# Patient Record
Sex: FEMALE | Race: WHITE | NOT HISPANIC OR LATINO
[De-identification: names, ages, dates, MRNs, and addresses within clinical notes are randomized per-mention and may not be internally consistent; named-entity substitution may affect disease eponyms.]

---

## 2017-10-31 ENCOUNTER — TRANSCRIPTION ENCOUNTER (OUTPATIENT)
Age: 72
End: 2017-10-31

## 2019-09-24 ENCOUNTER — APPOINTMENT (OUTPATIENT)
Dept: CARDIOLOGY | Facility: CLINIC | Age: 74
End: 2019-09-24
Payer: MEDICARE

## 2019-09-24 VITALS
SYSTOLIC BLOOD PRESSURE: 160 MMHG | BODY MASS INDEX: 27.99 KG/M2 | HEART RATE: 91 BPM | HEIGHT: 65 IN | DIASTOLIC BLOOD PRESSURE: 86 MMHG | OXYGEN SATURATION: 97 % | RESPIRATION RATE: 16 BRPM | WEIGHT: 168 LBS

## 2019-09-24 PROBLEM — Z00.00 ENCOUNTER FOR PREVENTIVE HEALTH EXAMINATION: Status: ACTIVE | Noted: 2019-09-24

## 2019-09-24 PROCEDURE — 99204 OFFICE O/P NEW MOD 45 MIN: CPT

## 2019-09-24 RX ORDER — CEPHALEXIN 500 MG/1
500 CAPSULE ORAL 3 TIMES DAILY
Qty: 30 | Refills: 0 | Status: ACTIVE | COMMUNITY
Start: 2019-09-24 | End: 1900-01-01

## 2019-09-24 RX ORDER — RIVAROXABAN 10 MG/1
10 TABLET, FILM COATED ORAL
Qty: 45 | Refills: 0 | Status: ACTIVE | COMMUNITY
Start: 2019-09-24 | End: 1900-01-01

## 2019-09-24 NOTE — PHYSICAL EXAM
[General Appearance - Well Developed] : well developed [Normal Appearance] : normal appearance [Well Groomed] : well groomed [General Appearance - Well Nourished] : well nourished [No Deformities] : no deformities [General Appearance - In No Acute Distress] : no acute distress [Normal Conjunctiva] : the conjunctiva exhibited no abnormalities [Eyelids - No Xanthelasma] : the eyelids demonstrated no xanthelasmas [Normal Oral Mucosa] : normal oral mucosa [No Oral Pallor] : no oral pallor [No Oral Cyanosis] : no oral cyanosis [Normal Jugular Venous A Waves Present] : normal jugular venous A waves present [Normal Jugular Venous V Waves Present] : normal jugular venous V waves present [No Jugular Venous Donahue A Waves] : no jugular venous donahue A waves [Heart Rate And Rhythm] : heart rate and rhythm were normal [Heart Sounds] : normal S1 and S2 [Murmurs] : no murmurs present [Respiration, Rhythm And Depth] : normal respiratory rhythm and effort [Exaggerated Use Of Accessory Muscles For Inspiration] : no accessory muscle use [Auscultation Breath Sounds / Voice Sounds] : lungs were clear to auscultation bilaterally [Oriented To Time, Place, And Person] : oriented to person, place, and time [Affect] : the affect was normal [Mood] : the mood was normal [No Anxiety] : not feeling anxious [Abnormal Walk] : normal gait [Gait - Sufficient For Exercise Testing] : the gait was sufficient for exercise testing [Nail Clubbing] : no clubbing of the fingernails [Petechial Hemorrhages (___cm)] : no petechial hemorrhages [Cyanosis, Localized] : no localized cyanosis [] : no ischemic changes [FreeTextEntry1] : Scattered VV.  The R anterior thigh branch/GSV is firm, tender to touch, non-compressible.  The skin overlying it is hot and erythematous

## 2019-09-24 NOTE — REVIEW OF SYSTEMS
[Joint Pain] : joint pain [Lower Ext Edema] : lower extremity edema [Muscle Cramps] : muscle cramps [Joint Swelling] : joint swelling [Negative] : Endocrine

## 2019-09-24 NOTE — ASSESSMENT
[FreeTextEntry1] : Assessment:\par 1.  Very symptomatic Superficial thrombophlebitis \par - rapidly progressive symptoms\par - surrounding cellulitis\par 2.  Varicose veins\par \par Plan\par 1.  Duplex today with acute, GSV/anterior thigh branch thrombosis.  NO DVT\par 2.  SURPRISE trial : will treat with half dose Xarelto 10mg daily for 45 days\par 3.  1 week of keflex\par 4.  Repeat duplex in 4 weeks to watch for interval improvement\par 5.  Return after duplex in 4 weeks.

## 2019-09-24 NOTE — REASON FOR VISIT
[Initial Evaluation] : an initial evaluation of [FreeTextEntry1] : 74F HLD, hypothyroidism, COPD/emphysema.  Quit smoking 16 years ago.  She had Epidural shots to the back x 2 this past month.  Had leg cramping after the injection.  Had another injection.  Asked to see a vascular doctor for evaluation.  \par \par The leg pains started after the injections.  She complains of bulging veins.  She does not wear compression garments.  She reports a history of superficial thrombophlebitis 45 years ago on the right (provoked by trauma).  \par \par She reports a history of 2 meniscal tears on the right knee.  The left leg feels ok.  \par \par Cardiologist:  Will see Dr. Irwin soon.  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  \par \par Past Medical History \par Disease Name Date Onset Notes \par Chronic obstructive pulmonary disease 01/31/2013 --  \par Hypercholesterolemia 01/31/2013 --  \par Hypothyroidism 01/31/2013 --  \par \par \par \par \par Medication List \par Name Date Started Instructions \par bilateral mammography 07/08/2019 yearly screening \par diclofenac sodium 50 mg oral tablet,delayed release (/EC) 04/24/2017 take 1 tablet by oral route 2 times a day as needed for 90 days \par gabapentin 300 mg oral capsule 02/06/2019 TAKE 1 CAPSULE TWICE A DAY \par ProAir HFA 90 mcg/actuation inhalation HFA aerosol inhaler 01/22/2015 inhale 2 puffs by inhalation route every 4-6 hours as needed \par Serevent Diskus 50 mcg/dose inhalation blister with device 07/19/2019 USE 1 INHALATION TWICE A DAY IN THE MORNING AND EVENING APPROXIMATELY 12 HOURS APART \par simvastatin 40 mg oral tablet 09/09/2019 TAKE 1 TABLET DAILY IN THE EVENING \par Synthroid 112 mcg oral tablet 05/28/2019 TAKE 1 TABLET DAILY \par \par \par \par \par Allergy List \par Allergen Name Date Reaction Notes \par NO KNOWN DRUG ALLERGIES --  --  --  \par \par \par \par \par Social History \par Finding Status Start/Stop Quantity Notes \par Tobacco Former --/-- --  --  \par \par \par Ultrasound Duplex:  Negative for DVT

## 2019-11-12 ENCOUNTER — APPOINTMENT (OUTPATIENT)
Dept: CARDIOLOGY | Facility: CLINIC | Age: 74
End: 2019-11-12
Payer: MEDICARE

## 2019-11-12 PROCEDURE — 99213 OFFICE O/P EST LOW 20 MIN: CPT

## 2019-11-22 VITALS
DIASTOLIC BLOOD PRESSURE: 86 MMHG | SYSTOLIC BLOOD PRESSURE: 130 MMHG | BODY MASS INDEX: 28.82 KG/M2 | WEIGHT: 173 LBS | HEART RATE: 74 BPM | RESPIRATION RATE: 16 BRPM | HEIGHT: 65 IN | OXYGEN SATURATION: 98 %

## 2019-11-22 NOTE — REVIEW OF SYSTEMS
[Lower Ext Edema] : no extremity edema [Joint Pain] : joint pain [Joint Swelling] : joint swelling [Muscle Cramps] : muscle cramps [Negative] : Heme/Lymph

## 2019-11-22 NOTE — REASON FOR VISIT
[Follow-Up - Clinic] : a clinic follow-up of [FreeTextEntry1] : Followup visit 11/22/2019\par \par She stopped Xarelto 4 days ago and transitioned to aspirin 81mg daily \par Her legs feel ok. Her symptoms improved very quickly with keflex and Xarelto.  \par No bleeding issues.  No CP or SOB.  Feels well overall.  \par \par \par Initial Visit:\par \par 74F HLD, hypothyroidism, COPD/emphysema.  Quit smoking 16 years ago.  She had Epidural shots to the back x 2 this past month.  Had leg cramping after the injection.  Had another injection.  Asked to see a vascular doctor for evaluation.  \par \par The leg pains started after the injections.  She complains of bulging veins.  She does not wear compression garments.  She reports a history of superficial thrombophlebitis 45 years ago on the right (provoked by trauma).  \par \par She reports a history of 2 meniscal tears on the right knee.  The left leg feels ok.  \par \par Cardiologist:  Will see Dr. Irwin soon.  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  \par \par Past Medical History \par Disease Name Date Onset Notes \par Chronic obstructive pulmonary disease 01/31/2013 --  \par Hypercholesterolemia 01/31/2013 --  \par Hypothyroidism 01/31/2013 --  \par \par \par \par \par Medication List \par Name Date Started Instructions \par bilateral mammography 07/08/2019 yearly screening \par diclofenac sodium 50 mg oral tablet,delayed release (/EC) 04/24/2017 take 1 tablet by oral route 2 times a day as needed for 90 days \par gabapentin 300 mg oral capsule 02/06/2019 TAKE 1 CAPSULE TWICE A DAY \par ProAir HFA 90 mcg/actuation inhalation HFA aerosol inhaler 01/22/2015 inhale 2 puffs by inhalation route every 4-6 hours as needed \par Serevent Diskus 50 mcg/dose inhalation blister with device 07/19/2019 USE 1 INHALATION TWICE A DAY IN THE MORNING AND EVENING APPROXIMATELY 12 HOURS APART \par simvastatin 40 mg oral tablet 09/09/2019 TAKE 1 TABLET DAILY IN THE EVENING \par Synthroid 112 mcg oral tablet 05/28/2019 TAKE 1 TABLET DAILY \par \par \par \par \par Allergy List \par Allergen Name Date Reaction Notes \par NO KNOWN DRUG ALLERGIES --  --  --  \par \par \par \par \par Social History \par Finding Status Start/Stop Quantity Notes \par Tobacco Former --/-- --  --  \par \par \par Ultrasound Duplex:  Negative for DVT

## 2019-11-22 NOTE — ASSESSMENT
[FreeTextEntry1] : Assessment:\par 1.  Very symptomatic Superficial thrombophlebitis  - Resolved\par  2.  Varicose veins\par \par Plan\par 1. She will remain off Xarelto\par 2.  Continue aspirin 81mg daily \par 3.  Frequent ambulation, compression garments, stay well hydrated\par 4.  Continue compression garments - don in AM and doff in PM\par 5.  We discussed red flags of recurrent thrombophlebitis.  If this occurs she knows to call or to obtain a repeat venous duplex\par 6.  Otherwise if she remains asymptomatic and no recurrence, return PRN

## 2019-11-22 NOTE — PHYSICAL EXAM
[General Appearance - Well Developed] : well developed [Normal Appearance] : normal appearance [Well Groomed] : well groomed [General Appearance - Well Nourished] : well nourished [No Deformities] : no deformities [General Appearance - In No Acute Distress] : no acute distress [Normal Conjunctiva] : the conjunctiva exhibited no abnormalities [Eyelids - No Xanthelasma] : the eyelids demonstrated no xanthelasmas [Normal Oral Mucosa] : normal oral mucosa [No Oral Pallor] : no oral pallor [No Oral Cyanosis] : no oral cyanosis [Normal Jugular Venous A Waves Present] : normal jugular venous A waves present [Normal Jugular Venous V Waves Present] : normal jugular venous V waves present [No Jugular Venous Donahue A Waves] : no jugular venous donahue A waves [Respiration, Rhythm And Depth] : normal respiratory rhythm and effort [Exaggerated Use Of Accessory Muscles For Inspiration] : no accessory muscle use [Auscultation Breath Sounds / Voice Sounds] : lungs were clear to auscultation bilaterally [Heart Rate And Rhythm] : heart rate and rhythm were normal [Heart Sounds] : normal S1 and S2 [Murmurs] : no murmurs present [Abnormal Walk] : normal gait [Gait - Sufficient For Exercise Testing] : the gait was sufficient for exercise testing [Nail Clubbing] : no clubbing of the fingernails [Cyanosis, Localized] : no localized cyanosis [Petechial Hemorrhages (___cm)] : no petechial hemorrhages [] : no ischemic changes [FreeTextEntry1] : Scattered VV. Non-tender VV.  Soft.  no erythema or tenderness along the leg.  Strong pedal pulses [Oriented To Time, Place, And Person] : oriented to person, place, and time [Affect] : the affect was normal [Mood] : the mood was normal [No Anxiety] : not feeling anxious

## 2019-12-29 ENCOUNTER — TRANSCRIPTION ENCOUNTER (OUTPATIENT)
Age: 74
End: 2019-12-29

## 2022-06-14 ENCOUNTER — APPOINTMENT (OUTPATIENT)
Dept: CARDIOLOGY | Facility: CLINIC | Age: 77
End: 2022-06-14
Payer: MEDICARE

## 2022-06-14 DIAGNOSIS — I80.9 PHLEBITIS AND THROMBOPHLEBITIS OF UNSPECIFIED SITE: ICD-10-CM

## 2022-06-14 PROCEDURE — 99214 OFFICE O/P EST MOD 30 MIN: CPT

## 2022-06-23 PROBLEM — I80.9 SUPERFICIAL THROMBOPHLEBITIS: Status: ACTIVE | Noted: 2019-09-24

## 2022-06-23 NOTE — ASSESSMENT
[FreeTextEntry1] : Assessment:\par 1.  Very symptomatic Superficial thrombophlebitis  - Resolved\par  2.  Varicose veins\par \par Plan\par 1. She will remain off Xarelto\par 2.  Continue aspirin 81mg daily \par 3.  Frequent ambulation, compression garments, stay well hydrated\par 4.  Continue compression garments - don in AM and doff in PM\par 5.  We discussed red flags of recurrent thrombophlebitis.  If this occurs she knows to call or to obtain a repeat venous duplex and to call me - if symptomatic, i will send in xarelto 10mg daily in the event that she has thrombophlebitis.  \par 6.  Otherwise if she remains asymptomatic and no recurrence, return PRN

## 2022-06-23 NOTE — PHYSICAL EXAM
[General Appearance - Well Developed] : well developed [Normal Appearance] : normal appearance [Well Groomed] : well groomed [No Deformities] : no deformities [General Appearance - Well Nourished] : well nourished [General Appearance - In No Acute Distress] : no acute distress [Normal Conjunctiva] : the conjunctiva exhibited no abnormalities [Eyelids - No Xanthelasma] : the eyelids demonstrated no xanthelasmas [No Oral Pallor] : no oral pallor [Normal Oral Mucosa] : normal oral mucosa [No Oral Cyanosis] : no oral cyanosis [Normal Jugular Venous A Waves Present] : normal jugular venous A waves present [Normal Jugular Venous V Waves Present] : normal jugular venous V waves present [No Jugular Venous Donahue A Waves] : no jugular venous donahue A waves [Respiration, Rhythm And Depth] : normal respiratory rhythm and effort [Exaggerated Use Of Accessory Muscles For Inspiration] : no accessory muscle use [Auscultation Breath Sounds / Voice Sounds] : lungs were clear to auscultation bilaterally [Heart Rate And Rhythm] : heart rate and rhythm were normal [Heart Sounds] : normal S1 and S2 [Murmurs] : no murmurs present [Abnormal Walk] : normal gait [Gait - Sufficient For Exercise Testing] : the gait was sufficient for exercise testing [Nail Clubbing] : no clubbing of the fingernails [Cyanosis, Localized] : no localized cyanosis [Petechial Hemorrhages (___cm)] : no petechial hemorrhages [] : no ischemic changes [Oriented To Time, Place, And Person] : oriented to person, place, and time [Affect] : the affect was normal [Mood] : the mood was normal [No Anxiety] : not feeling anxious [FreeTextEntry1] : Scattered VV. Non-tender VV.  Soft.  no erythema or tenderness along the leg.  Strong pedal pulses

## 2022-06-23 NOTE — REASON FOR VISIT
[Follow-Up - Clinic] : a clinic follow-up of [FreeTextEntry1] : 6/14/2022\par Doing ok\par remains on aspirin 81\par had booster, felt some phlebitis, took full stregth aspirin, went away.\par Duplex was negative for DVT\par R inner thigh with soft, compressible VV\par some edema\par no cp or sob\par \par \par Followup visit 11/22/2019\par \par She stopped Xarelto 4 days ago and transitioned to aspirin 81mg daily \par Her legs feel ok. Her symptoms improved very quickly with keflex and Xarelto.  \par No bleeding issues.  No CP or SOB.  Feels well overall.  \par \par \par Initial Visit:\par \par 74F HLD, hypothyroidism, COPD/emphysema.  Quit smoking 16 years ago.  She had Epidural shots to the back x 2 this past month.  Had leg cramping after the injection.  Had another injection.  Asked to see a vascular doctor for evaluation.  \par \par The leg pains started after the injections.  She complains of bulging veins.  She does not wear compression garments.  She reports a history of superficial thrombophlebitis 45 years ago on the right (provoked by trauma).  \par \par She reports a history of 2 meniscal tears on the right knee.  The left leg feels ok.  \par \par Cardiologist:  Will see Dr. Irwin soon.  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  \par \par Past Medical History \par Disease Name Date Onset Notes \par Chronic obstructive pulmonary disease 01/31/2013 --  \par Hypercholesterolemia 01/31/2013 --  \par Hypothyroidism 01/31/2013 --  \par \par \par \par \par Medication List \par Name Date Started Instructions \par bilateral mammography 07/08/2019 yearly screening \par diclofenac sodium 50 mg oral tablet,delayed release (/EC) 04/24/2017 take 1 tablet by oral route 2 times a day as needed for 90 days \par gabapentin 300 mg oral capsule 02/06/2019 TAKE 1 CAPSULE TWICE A DAY \par ProAir HFA 90 mcg/actuation inhalation HFA aerosol inhaler 01/22/2015 inhale 2 puffs by inhalation route every 4-6 hours as needed \par Serevent Diskus 50 mcg/dose inhalation blister with device 07/19/2019 USE 1 INHALATION TWICE A DAY IN THE MORNING AND EVENING APPROXIMATELY 12 HOURS APART \par simvastatin 40 mg oral tablet 09/09/2019 TAKE 1 TABLET DAILY IN THE EVENING \par Synthroid 112 mcg oral tablet 05/28/2019 TAKE 1 TABLET DAILY \par \par \par \par \par Allergy List \par Allergen Name Date Reaction Notes \par NO KNOWN DRUG ALLERGIES --  --  --  \par \par \par \par \par Social History \par Finding Status Start/Stop Quantity Notes \par Tobacco Former --/-- --  --  \par \par \par Ultrasound Duplex:  Negative for DVT

## 2022-10-26 ENCOUNTER — NON-APPOINTMENT (OUTPATIENT)
Age: 77
End: 2022-10-26

## 2023-01-16 ENCOUNTER — NON-APPOINTMENT (OUTPATIENT)
Age: 78
End: 2023-01-16

## 2024-03-15 ENCOUNTER — APPOINTMENT (OUTPATIENT)
Dept: ORTHOPEDIC SURGERY | Facility: CLINIC | Age: 79
End: 2024-03-15
Payer: MEDICARE

## 2024-03-15 DIAGNOSIS — Z86.79 PERSONAL HISTORY OF OTHER DISEASES OF THE CIRCULATORY SYSTEM: ICD-10-CM

## 2024-03-15 DIAGNOSIS — J43.9 EMPHYSEMA, UNSPECIFIED: ICD-10-CM

## 2024-03-15 DIAGNOSIS — M17.11 UNILATERAL PRIMARY OSTEOARTHRITIS, RIGHT KNEE: ICD-10-CM

## 2024-03-15 PROCEDURE — 99205 OFFICE O/P NEW HI 60 MIN: CPT

## 2024-03-15 PROCEDURE — 73564 X-RAY EXAM KNEE 4 OR MORE: CPT | Mod: RT

## 2024-03-15 NOTE — HISTORY OF PRESENT ILLNESS
[7] : 7 [8] : 8 [Sharp] : sharp [] : yes [Nothing helps with pain getting better] : Nothing helps with pain getting better [Lying in bed] : lying in bed [de-identified] : Patient presents right knee pain. Pain keeps her up at night and she has to put a cushion underneath her knee. Received gel shots 5-6 years ago, helped very little. Nothing helps the pain improve.   PMHx   [FreeTextEntry7] : into right ankle

## 2024-03-15 NOTE — PHYSICAL EXAM
[Right] : right knee [] : no erythema [TWNoteComboBox7] : flexion 110 degrees [de-identified] : extension 0 degrees

## 2024-03-15 NOTE — ASSESSMENT
[FreeTextEntry1] : 78 year F WITH MODERATE RT KNEE PAIN. HAS TRIED INJECTIONS IN THE PAST WITH MINIMAL RELIEF. PAIN WORSENS WITH STAIRS AND WALKING PROLONGED DISTANCES. PAIN IS AFFECTING ADL AND FUNCTIONAL ACTIVITIES. XRAYS REVIEWED WITH ADVANCED TRICOMPARTMENTAL OA, VALGUS ALIGNMENT. TREATMENT OPTIONS REVIEWED. QUESTIONS ANSWERED. RT TKA DISCUSSED AT LENGTH.   PMHx: HTN, HLD, EMPHYSEMA, H/O RECURRENT BLADDER/URINARY TRACT INFECTIONS, H/O RT SIDED SUPERFICIAL THROMBOPHLEBITIS NO METAL ALLERGIES NO H/O DM NO H/O DVT/PE NO H/O MRSA/VRSA  RT TKA -   DISCUSSED R&B OF TKA. WILL ORDER CT TO EVAL FOR BONE LOSS AND DEFORMITY FOR PRE-OP PLANNING.   The patient was advised of the diagnosis. The natural history of the pathology was explained in full to the patient in layman's terms. All questions were answered. The risks and benefits of surgical and non-surgical treatment alternatives were explained in full to the patient.   We discussed my findings and the natural history of their condition. We talked about the details of the proposed surgery and the recovery. We discussed the material risks, possible benefits and alternatives to surgery. The risks include but are not limited to infection, bleeding and possible need for blood transfusion, fracture, bowel blockage, bladder retention or infection, need for reoperation, stiffness and/or limited range of motion, possible damage to nerves and blood vessels, failure of fixation of components, risk of deep vein thromboses and pulmonary embolism, wound healing problems, dislocation, and possible leg length discrepancy. Although incredibly rare, we also discussed the risks of a cardiac event, stroke and even death during, or following, the surgery. We discussed the type of implants the patient will be receiving and the type of fixation that will be used, as well as whether a robot or computer navigation aide will be used. The patient understands they will need medical clearance and will attend a preoperative joint education class. We also discussed the type of anesthesia they will receive, and the risks associated with hospital or rehab length of stay, obesity, diabetes and smoking.   Patient Complains of pain in the affected joint with a level that often reaches greater than a 8/10. The pain has been progressively worsening throughout his/her treatment course. The pain has interfered with their ADLs and worsens with weight bearing. On exam they often have episodes of swelling/effusion with limited ROM. Pain worsens with ROM passive and active and I can palpate crepitus.   X- rays were reviewed with the patient and they show joint space narrowing, subchondral sclerosis, osteophyte formation, and subchondral cysts. After a period of more than 12 weeks physical therapy or exercise program done with me or another treating physician they have continued pain. The patient has failed a trial of NSAID medication or pain relievers if they were unable to tolerate NSAID medications as well as a series of injections, steroids, or hyaluronic acid. After a long discussion with the patient both the patient and I have decided we have exhausted all forms of less radical treatments and they would like to proceed with Total Joint Replacement.   Patient will plan to schedule surgery. Patient requires rolling walker and 3-in-1 commode S/P surgery. Patient currently as limited mobility that significantly impairs their ability to participate in one or more mobility related activities of daily living in the home. The patient is able to safely use the walker and the functional mobility deficit can be sufficiently resolved with the use of a walker. Patient will also be confined to one level of the home environment and there is no toilet on that level. Requires 3-in-1 commode for bedside use as patient cannot access bathroom safely in their home in timely manner. Will order rolling walker and 3-in-1 commode.

## 2024-04-08 ENCOUNTER — OUTPATIENT (OUTPATIENT)
Dept: OUTPATIENT SERVICES | Facility: HOSPITAL | Age: 79
LOS: 1 days | End: 2024-04-08
Payer: MEDICARE

## 2024-04-08 VITALS
TEMPERATURE: 98 F | HEIGHT: 60.5 IN | DIASTOLIC BLOOD PRESSURE: 80 MMHG | RESPIRATION RATE: 15 BRPM | HEART RATE: 75 BPM | SYSTOLIC BLOOD PRESSURE: 154 MMHG | OXYGEN SATURATION: 93 % | WEIGHT: 173.06 LBS

## 2024-04-08 DIAGNOSIS — M25.561 PAIN IN RIGHT KNEE: ICD-10-CM

## 2024-04-08 DIAGNOSIS — Z98.890 OTHER SPECIFIED POSTPROCEDURAL STATES: Chronic | ICD-10-CM

## 2024-04-08 DIAGNOSIS — Z98.891 HISTORY OF UTERINE SCAR FROM PREVIOUS SURGERY: Chronic | ICD-10-CM

## 2024-04-08 DIAGNOSIS — Z90.49 ACQUIRED ABSENCE OF OTHER SPECIFIED PARTS OF DIGESTIVE TRACT: Chronic | ICD-10-CM

## 2024-04-08 DIAGNOSIS — Z90.89 ACQUIRED ABSENCE OF OTHER ORGANS: Chronic | ICD-10-CM

## 2024-04-08 DIAGNOSIS — Z98.49 CATARACT EXTRACTION STATUS, UNSPECIFIED EYE: Chronic | ICD-10-CM

## 2024-04-08 DIAGNOSIS — Z01.818 ENCOUNTER FOR OTHER PREPROCEDURAL EXAMINATION: ICD-10-CM

## 2024-04-08 DIAGNOSIS — J43.9 EMPHYSEMA, UNSPECIFIED: ICD-10-CM

## 2024-04-08 DIAGNOSIS — M17.11 UNILATERAL PRIMARY OSTEOARTHRITIS, RIGHT KNEE: ICD-10-CM

## 2024-04-08 LAB
A1C WITH ESTIMATED AVERAGE GLUCOSE RESULT: 6.2 % — HIGH (ref 4–5.6)
ALBUMIN SERPL ELPH-MCNC: 3.9 G/DL — SIGNIFICANT CHANGE UP (ref 3.3–5)
ALP SERPL-CCNC: 68 U/L — SIGNIFICANT CHANGE UP (ref 30–120)
ALT FLD-CCNC: 18 U/L — SIGNIFICANT CHANGE UP (ref 10–60)
ANION GAP SERPL CALC-SCNC: 6 MMOL/L — SIGNIFICANT CHANGE UP (ref 5–17)
APTT BLD: 34.4 SEC — SIGNIFICANT CHANGE UP (ref 24.5–35.6)
AST SERPL-CCNC: 22 U/L — SIGNIFICANT CHANGE UP (ref 10–40)
BILIRUB SERPL-MCNC: 0.7 MG/DL — SIGNIFICANT CHANGE UP (ref 0.2–1.2)
BUN SERPL-MCNC: 13 MG/DL — SIGNIFICANT CHANGE UP (ref 7–23)
CALCIUM SERPL-MCNC: 9.2 MG/DL — SIGNIFICANT CHANGE UP (ref 8.4–10.5)
CHLORIDE SERPL-SCNC: 104 MMOL/L — SIGNIFICANT CHANGE UP (ref 96–108)
CO2 SERPL-SCNC: 31 MMOL/L — SIGNIFICANT CHANGE UP (ref 22–31)
CREAT SERPL-MCNC: 1.02 MG/DL — SIGNIFICANT CHANGE UP (ref 0.5–1.3)
EGFR: 56 ML/MIN/1.73M2 — LOW
ESTIMATED AVERAGE GLUCOSE: 131 MG/DL — HIGH (ref 68–114)
GLUCOSE SERPL-MCNC: 127 MG/DL — HIGH (ref 70–99)
HCT VFR BLD CALC: 42.5 % — SIGNIFICANT CHANGE UP (ref 34.5–45)
HGB BLD-MCNC: 13.7 G/DL — SIGNIFICANT CHANGE UP (ref 11.5–15.5)
INR BLD: 1.09 RATIO — SIGNIFICANT CHANGE UP (ref 0.85–1.18)
MCHC RBC-ENTMCNC: 30 PG — SIGNIFICANT CHANGE UP (ref 27–34)
MCHC RBC-ENTMCNC: 32.2 GM/DL — SIGNIFICANT CHANGE UP (ref 32–36)
MCV RBC AUTO: 93 FL — SIGNIFICANT CHANGE UP (ref 80–100)
MRSA PCR RESULT.: DETECTED
NRBC # BLD: 0 /100 WBCS — SIGNIFICANT CHANGE UP (ref 0–0)
PLATELET # BLD AUTO: 164 K/UL — SIGNIFICANT CHANGE UP (ref 150–400)
POTASSIUM SERPL-MCNC: 4.2 MMOL/L — SIGNIFICANT CHANGE UP (ref 3.5–5.3)
POTASSIUM SERPL-SCNC: 4.2 MMOL/L — SIGNIFICANT CHANGE UP (ref 3.5–5.3)
PROT SERPL-MCNC: 7.6 G/DL — SIGNIFICANT CHANGE UP (ref 6–8.3)
PROTHROM AB SERPL-ACNC: 11.8 SEC — SIGNIFICANT CHANGE UP (ref 9.5–13)
RBC # BLD: 4.57 M/UL — SIGNIFICANT CHANGE UP (ref 3.8–5.2)
RBC # FLD: 12.7 % — SIGNIFICANT CHANGE UP (ref 10.3–14.5)
S AUREUS DNA NOSE QL NAA+PROBE: DETECTED
SODIUM SERPL-SCNC: 141 MMOL/L — SIGNIFICANT CHANGE UP (ref 135–145)
WBC # BLD: 4.48 K/UL — SIGNIFICANT CHANGE UP (ref 3.8–10.5)
WBC # FLD AUTO: 4.48 K/UL — SIGNIFICANT CHANGE UP (ref 3.8–10.5)

## 2024-04-08 PROCEDURE — G0463: CPT

## 2024-04-08 PROCEDURE — 93010 ELECTROCARDIOGRAM REPORT: CPT

## 2024-04-08 PROCEDURE — 93005 ELECTROCARDIOGRAM TRACING: CPT

## 2024-04-08 NOTE — H&P PST ADULT - NSICDXFAMILYHX_GEN_ALL_CORE_FT
FAMILY HISTORY:  Father  Still living? No  FH: lymphoma, Age at diagnosis: Age Unknown    Mother  Still living? No  Family history of Alzheimer's disease, Age at diagnosis: Age Unknown    Sibling  Still living? No  FH: pancreatic cancer, Age at diagnosis: Age Unknown

## 2024-04-08 NOTE — H&P PST ADULT - PROBLEM SELECTOR PLAN 1
right total knee arthroplasty, preop instructions given; to go for medical, cardiac and pulm clearance

## 2024-04-08 NOTE — H&P PST ADULT - NSICDXPASTSURGICALHX_GEN_ALL_CORE_FT
PAST SURGICAL HISTORY:  S/P appendectomy     S/P cataract surgery     S/P  section     S/P eye surgery     S/P foot surgery, left     S/P laparoscopic cholecystectomy     S/P ovarian cystectomy     S/P right knee arthroscopy     S/P tonsillectomy

## 2024-04-08 NOTE — H&P PST ADULT - HISTORY OF PRESENT ILLNESS
this is a 77 y/o female who has had right knee pain for 7 yrs which has gotten worse, swelling present also; has had shots in the past and drainage of knee with temporary relief; to have right knee replacement

## 2024-04-08 NOTE — H&P PST ADULT - NSICDXPASTMEDICALHX_GEN_ALL_CORE_FT
PAST MEDICAL HISTORY:  DVT, lower extremity     Emphysema of lung     H/O low back pain     H/O varicose veins     Hyperlipidemia     Hypothyroid     Osteoarthritis     Urinary incontinence

## 2024-04-09 RX ORDER — MUPIROCIN 20 MG/G
1 OINTMENT TOPICAL
Qty: 1 | Refills: 0
Start: 2024-04-09 | End: 2024-04-13

## 2024-04-09 NOTE — PROGRESS NOTE ADULT - SUBJECTIVE AND OBJECTIVE BOX
Nasal PCR culture results: MRSA/MSSA detected  Topical mupirocin escribed   Left message for patient to ball back

## 2024-04-19 RX ORDER — APREPITANT 80 MG/1
40 CAPSULE ORAL ONCE
Refills: 0 | Status: DISCONTINUED | OUTPATIENT
Start: 2024-04-24 | End: 2024-04-25

## 2024-04-23 ENCOUNTER — TRANSCRIPTION ENCOUNTER (OUTPATIENT)
Age: 79
End: 2024-04-23

## 2024-04-24 ENCOUNTER — TRANSCRIPTION ENCOUNTER (OUTPATIENT)
Age: 79
End: 2024-04-24

## 2024-04-24 ENCOUNTER — APPOINTMENT (OUTPATIENT)
Dept: ORTHOPEDIC SURGERY | Facility: HOSPITAL | Age: 79
End: 2024-04-24
Payer: MEDICARE

## 2024-04-24 ENCOUNTER — INPATIENT (INPATIENT)
Facility: HOSPITAL | Age: 79
LOS: 0 days | Discharge: HOME | DRG: 470 | End: 2024-04-25
Attending: ORTHOPAEDIC SURGERY | Admitting: ORTHOPAEDIC SURGERY
Payer: COMMERCIAL

## 2024-04-24 VITALS
WEIGHT: 173.72 LBS | OXYGEN SATURATION: 98 % | HEART RATE: 83 BPM | TEMPERATURE: 98 F | HEIGHT: 61 IN | DIASTOLIC BLOOD PRESSURE: 70 MMHG | SYSTOLIC BLOOD PRESSURE: 154 MMHG | RESPIRATION RATE: 19 BRPM

## 2024-04-24 DIAGNOSIS — Z98.891 HISTORY OF UTERINE SCAR FROM PREVIOUS SURGERY: Chronic | ICD-10-CM

## 2024-04-24 DIAGNOSIS — Z90.49 ACQUIRED ABSENCE OF OTHER SPECIFIED PARTS OF DIGESTIVE TRACT: Chronic | ICD-10-CM

## 2024-04-24 DIAGNOSIS — M17.11 UNILATERAL PRIMARY OSTEOARTHRITIS, RIGHT KNEE: ICD-10-CM

## 2024-04-24 DIAGNOSIS — Z98.890 OTHER SPECIFIED POSTPROCEDURAL STATES: Chronic | ICD-10-CM

## 2024-04-24 DIAGNOSIS — Z98.49 CATARACT EXTRACTION STATUS, UNSPECIFIED EYE: Chronic | ICD-10-CM

## 2024-04-24 DIAGNOSIS — Z90.89 ACQUIRED ABSENCE OF OTHER ORGANS: Chronic | ICD-10-CM

## 2024-04-24 PROCEDURE — 99221 1ST HOSP IP/OBS SF/LOW 40: CPT

## 2024-04-24 PROCEDURE — 20985 CPTR-ASST DIR MS PX: CPT

## 2024-04-24 PROCEDURE — 27447 TOTAL KNEE ARTHROPLASTY: CPT | Mod: RT

## 2024-04-24 PROCEDURE — 73560 X-RAY EXAM OF KNEE 1 OR 2: CPT | Mod: 26,RT

## 2024-04-24 PROCEDURE — 27447 TOTAL KNEE ARTHROPLASTY: CPT | Mod: AS,RT

## 2024-04-24 DEVICE — MAKO BONE PIN 4MM X 140MM: Type: IMPLANTABLE DEVICE | Site: RIGHT | Status: FUNCTIONAL

## 2024-04-24 DEVICE — COMP FEM CRUCIATE RETAINING: Type: IMPLANTABLE DEVICE | Site: RIGHT | Status: FUNCTIONAL

## 2024-04-24 DEVICE — BASEPLATE TIB TRIATHLON TRITAN SZ 4: Type: IMPLANTABLE DEVICE | Site: RIGHT | Status: FUNCTIONAL

## 2024-04-24 DEVICE — INSERT TIB BEARING CS X3 SZ 4 9MM: Type: IMPLANTABLE DEVICE | Site: RIGHT | Status: FUNCTIONAL

## 2024-04-24 DEVICE — MAKO BONE PIN 4MM X 110MM: Type: IMPLANTABLE DEVICE | Site: RIGHT | Status: FUNCTIONAL

## 2024-04-24 DEVICE — PATELLA ASYMM TRIATHLON SZ A 32X10MM: Type: IMPLANTABLE DEVICE | Site: RIGHT | Status: FUNCTIONAL

## 2024-04-24 RX ORDER — CELECOXIB 200 MG/1
200 CAPSULE ORAL EVERY 12 HOURS
Refills: 0 | Status: DISCONTINUED | OUTPATIENT
Start: 2024-04-24 | End: 2024-04-25

## 2024-04-24 RX ORDER — ONDANSETRON 8 MG/1
4 TABLET, FILM COATED ORAL EVERY 6 HOURS
Refills: 0 | Status: DISCONTINUED | OUTPATIENT
Start: 2024-04-24 | End: 2024-04-25

## 2024-04-24 RX ORDER — SIMVASTATIN 20 MG/1
1 TABLET, FILM COATED ORAL
Refills: 0 | DISCHARGE

## 2024-04-24 RX ORDER — SIMVASTATIN 20 MG/1
40 TABLET, FILM COATED ORAL AT BEDTIME
Refills: 0 | Status: DISCONTINUED | OUTPATIENT
Start: 2024-04-24 | End: 2024-04-25

## 2024-04-24 RX ORDER — OXYCODONE HYDROCHLORIDE 5 MG/1
5 TABLET ORAL
Refills: 0 | Status: DISCONTINUED | OUTPATIENT
Start: 2024-04-24 | End: 2024-04-25

## 2024-04-24 RX ORDER — ASPIRIN/CALCIUM CARB/MAGNESIUM 324 MG
1 TABLET ORAL
Qty: 28 | Refills: 0
Start: 2024-04-24 | End: 2024-05-07

## 2024-04-24 RX ORDER — OLODATEROL RESPIMAT INHALATION SPRAY 2.5 UG/1
2 SPRAY, METERED RESPIRATORY (INHALATION)
Refills: 0 | DISCHARGE

## 2024-04-24 RX ORDER — MAGNESIUM HYDROXIDE 400 MG/1
30 TABLET, CHEWABLE ORAL DAILY
Refills: 0 | Status: DISCONTINUED | OUTPATIENT
Start: 2024-04-24 | End: 2024-04-25

## 2024-04-24 RX ORDER — SODIUM CHLORIDE 9 MG/ML
1000 INJECTION, SOLUTION INTRAVENOUS
Refills: 0 | Status: DISCONTINUED | OUTPATIENT
Start: 2024-04-24 | End: 2024-04-24

## 2024-04-24 RX ORDER — DEXAMETHASONE 0.5 MG/5ML
8 ELIXIR ORAL ONCE
Refills: 0 | Status: COMPLETED | OUTPATIENT
Start: 2024-04-25 | End: 2024-04-25

## 2024-04-24 RX ORDER — OMEPRAZOLE 10 MG/1
1 CAPSULE, DELAYED RELEASE ORAL
Qty: 30 | Refills: 0
Start: 2024-04-24

## 2024-04-24 RX ORDER — APIXABAN 2.5 MG/1
2.5 TABLET, FILM COATED ORAL EVERY 12 HOURS
Refills: 0 | Status: DISCONTINUED | OUTPATIENT
Start: 2024-04-25 | End: 2024-04-25

## 2024-04-24 RX ORDER — APIXABAN 2.5 MG/1
1 TABLET, FILM COATED ORAL
Qty: 28 | Refills: 0
Start: 2024-04-24 | End: 2024-05-07

## 2024-04-24 RX ORDER — HYDROMORPHONE HYDROCHLORIDE 2 MG/ML
0.5 INJECTION INTRAMUSCULAR; INTRAVENOUS; SUBCUTANEOUS
Refills: 0 | Status: DISCONTINUED | OUTPATIENT
Start: 2024-04-24 | End: 2024-04-24

## 2024-04-24 RX ORDER — ALBUTEROL 90 UG/1
2 AEROSOL, METERED ORAL EVERY 6 HOURS
Refills: 0 | Status: DISCONTINUED | OUTPATIENT
Start: 2024-04-24 | End: 2024-04-25

## 2024-04-24 RX ORDER — CEFAZOLIN SODIUM 1 G
2000 VIAL (EA) INJECTION ONCE
Refills: 0 | Status: COMPLETED | OUTPATIENT
Start: 2024-04-24 | End: 2024-04-24

## 2024-04-24 RX ORDER — VANCOMYCIN HCL 1 G
1000 VIAL (EA) INTRAVENOUS ONCE
Refills: 0 | Status: COMPLETED | OUTPATIENT
Start: 2024-04-24 | End: 2024-04-24

## 2024-04-24 RX ORDER — LEVOTHYROXINE SODIUM 125 MCG
112 TABLET ORAL DAILY
Refills: 0 | Status: DISCONTINUED | OUTPATIENT
Start: 2024-04-24 | End: 2024-04-25

## 2024-04-24 RX ORDER — SODIUM CHLORIDE 9 MG/ML
1000 INJECTION, SOLUTION INTRAVENOUS
Refills: 0 | Status: DISCONTINUED | OUTPATIENT
Start: 2024-04-24 | End: 2024-04-25

## 2024-04-24 RX ORDER — CEFAZOLIN SODIUM 1 G
2000 VIAL (EA) INJECTION EVERY 8 HOURS
Refills: 0 | Status: COMPLETED | OUTPATIENT
Start: 2024-04-24 | End: 2024-04-25

## 2024-04-24 RX ORDER — HYDROMORPHONE HYDROCHLORIDE 2 MG/ML
0.5 INJECTION INTRAMUSCULAR; INTRAVENOUS; SUBCUTANEOUS
Refills: 0 | Status: DISCONTINUED | OUTPATIENT
Start: 2024-04-24 | End: 2024-04-25

## 2024-04-24 RX ORDER — TRANEXAMIC ACID 100 MG/ML
1000 INJECTION, SOLUTION INTRAVENOUS ONCE
Refills: 0 | Status: COMPLETED | OUTPATIENT
Start: 2024-04-24 | End: 2024-04-24

## 2024-04-24 RX ORDER — LEVOTHYROXINE SODIUM 125 MCG
1 TABLET ORAL
Refills: 0 | DISCHARGE

## 2024-04-24 RX ORDER — ACETAMINOPHEN 500 MG
1000 TABLET ORAL ONCE
Refills: 0 | Status: COMPLETED | OUTPATIENT
Start: 2024-04-24 | End: 2024-04-24

## 2024-04-24 RX ORDER — ACETAMINOPHEN 500 MG
1000 TABLET ORAL EVERY 8 HOURS
Refills: 0 | Status: DISCONTINUED | OUTPATIENT
Start: 2024-04-24 | End: 2024-04-25

## 2024-04-24 RX ORDER — SENNA PLUS 8.6 MG/1
2 TABLET ORAL
Qty: 0 | Refills: 0 | DISCHARGE
Start: 2024-04-24

## 2024-04-24 RX ORDER — OXYCODONE HYDROCHLORIDE 5 MG/1
10 TABLET ORAL
Refills: 0 | Status: DISCONTINUED | OUTPATIENT
Start: 2024-04-24 | End: 2024-04-25

## 2024-04-24 RX ORDER — ACETAMINOPHEN 500 MG
2 TABLET ORAL
Qty: 0 | Refills: 0 | DISCHARGE
Start: 2024-04-24

## 2024-04-24 RX ORDER — MIRABEGRON 50 MG/1
1 TABLET, EXTENDED RELEASE ORAL
Refills: 0 | DISCHARGE

## 2024-04-24 RX ORDER — CHLORHEXIDINE GLUCONATE 213 G/1000ML
1 SOLUTION TOPICAL ONCE
Refills: 0 | Status: COMPLETED | OUTPATIENT
Start: 2024-04-24 | End: 2024-04-24

## 2024-04-24 RX ORDER — PANTOPRAZOLE SODIUM 20 MG/1
40 TABLET, DELAYED RELEASE ORAL
Refills: 0 | Status: DISCONTINUED | OUTPATIENT
Start: 2024-04-24 | End: 2024-04-25

## 2024-04-24 RX ORDER — HYDROMORPHONE HYDROCHLORIDE 2 MG/ML
0.2 INJECTION INTRAMUSCULAR; INTRAVENOUS; SUBCUTANEOUS
Refills: 0 | Status: DISCONTINUED | OUTPATIENT
Start: 2024-04-24 | End: 2024-04-24

## 2024-04-24 RX ORDER — SENNA PLUS 8.6 MG/1
2 TABLET ORAL AT BEDTIME
Refills: 0 | Status: DISCONTINUED | OUTPATIENT
Start: 2024-04-24 | End: 2024-04-25

## 2024-04-24 RX ORDER — POLYETHYLENE GLYCOL 3350 17 G/17G
17 POWDER, FOR SOLUTION ORAL AT BEDTIME
Refills: 0 | Status: DISCONTINUED | OUTPATIENT
Start: 2024-04-24 | End: 2024-04-25

## 2024-04-24 RX ORDER — POLYETHYLENE GLYCOL 3350 17 G/17G
17 POWDER, FOR SOLUTION ORAL
Qty: 0 | Refills: 0 | DISCHARGE
Start: 2024-04-24

## 2024-04-24 RX ORDER — CELECOXIB 200 MG/1
1 CAPSULE ORAL
Qty: 56 | Refills: 0
Start: 2024-04-24 | End: 2024-05-21

## 2024-04-24 RX ADMIN — Medication 1000 MILLIGRAM(S): at 22:17

## 2024-04-24 RX ADMIN — Medication 1000 MILLIGRAM(S): at 21:43

## 2024-04-24 RX ADMIN — Medication 250 MILLIGRAM(S): at 19:13

## 2024-04-24 RX ADMIN — CELECOXIB 200 MILLIGRAM(S): 200 CAPSULE ORAL at 22:17

## 2024-04-24 RX ADMIN — Medication 250 MILLIGRAM(S): at 07:02

## 2024-04-24 RX ADMIN — Medication 100 MILLIGRAM(S): at 15:27

## 2024-04-24 RX ADMIN — Medication 400 MILLIGRAM(S): at 14:35

## 2024-04-24 RX ADMIN — SIMVASTATIN 40 MILLIGRAM(S): 20 TABLET, FILM COATED ORAL at 21:44

## 2024-04-24 RX ADMIN — CHLORHEXIDINE GLUCONATE 1 APPLICATION(S): 213 SOLUTION TOPICAL at 07:30

## 2024-04-24 RX ADMIN — CELECOXIB 200 MILLIGRAM(S): 200 CAPSULE ORAL at 21:44

## 2024-04-24 RX ADMIN — Medication 1000 MILLIGRAM(S): at 16:12

## 2024-04-24 RX ADMIN — SODIUM CHLORIDE 125 MILLILITER(S): 9 INJECTION, SOLUTION INTRAVENOUS at 14:37

## 2024-04-24 NOTE — CARE COORDINATION ASSESSMENT. - NSDCPLANSERVICES_GEN_ALL_CORE
CM met with patient, her  and son  at bedside. Patient agreeable to discuss transition of care with her family present.  Patient. A&O x 4. Pt s/p  PROCEDURES: Total right knee replacement.   Pt  resting comfortably / Pt has no complaints at this time.  CM explained role of CM and availability to assist with discharge planning throughout stay.   Provided CM contact information and pt. verbalized understanding. Patient lives in a private house with her , 3 +3 steps with HR to enter. Prior to surgery patient was ind in adls. Patient identified her  as her caregiver at home who will assist her during her recuperation and will transport her home and to MD appointments.   Pt. is agreeable with PT/OT's recommendations for d/c plan to home with HC/PT.  CM explained home care expectations, process, insurance provisions and home safety with good understanding.   Offered list of [facility] and pt. chose [facility] at home care agency.  Provided discharge resources folder. CM made referral accordingly.  Informed them of Anticipated  DC date for 4/25/2024 and for SOC 4/26/2024.  Patient provided with info on the transitional care management/health solutions team who will be following her upon DC.    Noted pt has a [facility] drsg in place/  Educational flyer provided and reviewed with the patient.  Pt verbalizing understanding and in agreement with d/c plans.  DME: Pt has a RW, commode  and cane at home.  PCP: Dr Donaldson [phone]  Pt stated she will be receiving meds to bed from [facility] pharmacy prior to DC./Home Care

## 2024-04-24 NOTE — CONSULT NOTE ADULT - SUBJECTIVE AND OBJECTIVE BOX
79F with HTN, hypothyroidism, urinary incontinence and R knee OA s/p right TKA w/ jina guidance     The patient was seen postoperatively on the medical covarrubias  Reported minimal pain  Denied nausea, CP, SOB or HA      REVIEW OF SYSTEMS:  CONSTITUTIONAL: No fever, weight loss, or fatigue    EYES: No eye pain, visual disturbances, or discharge    ENMT:  No difficulty hearing, tinnitus, vertigo; No sinus or throat pain    NECK: No pain or stiffness    RESPIRATORY: No cough, wheezing, chills or hemoptysis; No shortness of breath    CARDIOVASCULAR: No chest pain, palpitations, dizziness, or leg swelling    GASTROINTESTINAL: No abdominal or epigastric pain. No nausea, vomiting, or hematemesis; No diarrhea or constipation. No melena or hematochezia.    NEUROLOGICAL: No headaches, memory loss, loss of strength, numbness, or tremors    SKIN: No itching, burning, rashes, or lesions     LYMPH NODES: No enlarged glands    ENDOCRINE: No heat or cold intolerance; No hair loss    PSYCHIATRIC: No depression, anxiety, mood swings, or difficulty sleeping    HEME/LYMPH: No easy bruising, or bleeding gums      ALLERGY AND IMMUNOLOGIC: No hives or eczema      PAST MEDICAL & SURGICAL HISTORY:  Hypothyroid      Hyperlipidemia      Urinary incontinence      Emphysema of lung      Osteoarthritis      H/O low back pain      DVT, lower extremity      H/O varicose veins      S/P tonsillectomy      S/P appendectomy      S/P ovarian cystectomy      S/P  section      S/P laparoscopic cholecystectomy      S/P right knee arthroscopy      S/P foot surgery, left      S/P cataract surgery      S/P eye surgery          SOCIAL HISTORY:  Residence: [ ] [facility]  [ ] [facility]  [ ] Community  [ ] Substance abuse:   [ ] Tobacco:   [ ] Alcohol use:     Allergies    No Known Allergies    Intolerances        MEDICATIONS  (STANDING):  acetaminophen     Tablet .. 1000 milliGRAM(s) Oral every 8 hours  aprepitant 40 milliGRAM(s) Oral once  ceFAZolin   IVPB 2000 milliGRAM(s) IV Intermittent every 8 hours  celecoxib 200 milliGRAM(s) Oral every 12 hours  lactated ringers. 1000 milliLiter(s) (125 mL/Hr) IV Continuous <Continuous>  levothyroxine 112 MICROGram(s) Oral daily  pantoprazole    Tablet 40 milliGRAM(s) Oral before breakfast  polyethylene glycol 3350 17 Gram(s) Oral at bedtime  senna 2 Tablet(s) Oral at bedtime  simvastatin 40 milliGRAM(s) Oral at bedtime  vancomycin  IVPB 1000 milliGRAM(s) IV Intermittent once    MEDICATIONS  (PRN):  albuterol    90 MICROgram(s) HFA Inhaler 2 Puff(s) Inhalation every 6 hours PRN Shortness of Breath and/or Wheezing  aluminum hydroxide/magnesium hydroxide/simethicone Suspension 30 milliLiter(s) Oral four times a day PRN Indigestion  HYDROmorphone  Injectable 0.5 milliGRAM(s) IV Push every 3 hours PRN Breakthrough pain  magnesium hydroxide Suspension 30 milliLiter(s) Oral daily PRN Constipation  ondansetron Injectable 4 milliGRAM(s) IV Push every 6 hours PRN Nausea and/or Vomiting  oxyCODONE    IR 10 milliGRAM(s) Oral every 3 hours PRN Severe Pain (7 - 10)  oxyCODONE    IR 5 milliGRAM(s) Oral every 3 hours PRN Moderate Pain (4 - 6)      FAMILY HISTORY:  FH: lymphoma (Father)    Family history of Alzheimer's disease (Mother)    FH: pancreatic cancer (Sibling)        Vital Signs Last 24 Hrs  T(C): 36.4 (2024 15:32), Max: 36.7 (2024 12:06)  T(F): 97.6 (2024 15:32), Max: 98 (2024 12:06)  HR: 72 (2024 15:32) (62 - 83)  BP: 169/74 (2024 15:32) (95/50 - 169/74)  BP(mean): --  RR: 18 (2024 15:32) (13 - 19)  SpO2: 97% (2024 15:32) (94% - 100%)    Parameters below as of 2024 12:06  Patient On (Oxygen Delivery Method): nasal cannula  O2 Flow (L/min): 2    PHYSICAL EXAM:  GENERAL: NAD   HEAD:  Atraumatic, Normocephalic    EYES: EOMI, PERRLA, conjunctiva and sclera clear    NERVOUS SYSTEM:  Alert & Oriented X3, Good concentration; Moving all 4 extremities; No gross sensory deficits    CHEST/LUNG: Clear to auscultation bilaterally; No rales, rhonchi, wheezing, or rubs    HEART: Regular rate and rhythm; No murmurs, rubs, or gallops    ABDOMEN: Soft, Nontender, Nondistended; Bowel sounds present    EXTREMITIES:  2+ Peripheral Pulses, No clubbing, cyanosis, or edema    INCISION R knee dressing clean and dry, neurovascularly intact       LABS:              CAPILLARY BLOOD GLUCOSE          RADIOLOGY & ADDITIONAL STUDIES:    EKG:   Personally Reviewed:  [ ] YES     Imaging:   Personally Reviewed:  [ ] YES     Consultant(s) Notes Reviewed:      Care Discussed with Consultants/Other Providers:

## 2024-04-24 NOTE — CONSULT NOTE ADULT - ASSESSMENT
79F with HLD, hypothyroidism, urinary incontinence POD#1 right TKA    POD#0 R TKA   Multimodal analgesia   Bowel regimen   PT/OT evaluations   VTE ppx    Incentive spirometry    Obtain basic labs     Hypothyroidism   Continue synthroid 112mcg qAM     HTN   Continue zocor 40mg HS     Urinary incontinence  Continue myrbetriq 50mg qd    79F with HLD, hypothyroidism, urinary incontinence POD#0 right TKA    POD#0 R TKA   Multimodal analgesia   Bowel regimen   PT/OT evaluations   VTE ppx    Incentive spirometry    Obtain basic labs     Hypothyroidism   Continue synthroid 112mcg qAM     HTN   Continue zocor 40mg HS     Urinary incontinence  Continue myrbetriq 50mg qd

## 2024-04-24 NOTE — DISCHARGE NOTE NURSING/CASE MANAGEMENT/SOCIAL WORK - PATIENT PORTAL LINK FT
You can access the FollowMyHealth Patient Portal offered by [facility] by registering at the following website: [email or web address]. By joining [employer]’s FollowMyHealth portal, you will also be able to view your health information using other applications (apps) compatible with our system.

## 2024-04-24 NOTE — DISCHARGE NOTE PROVIDER - CARE PROVIDER_API CALL
Marco Hall Eusebio  Orthopaedic Surgery  [address] [place] [id number]  Phone: [phone]  Fax: [phone]  Established Patient  Scheduled Appointment: 05/07/2024 11:00 AM

## 2024-04-24 NOTE — BRIEF OPERATIVE NOTE - COMMENTS
Implants: triathalon: tibia #4 tritanium w/ 9 mm CS X3 insert, femur #5 right, patella 32 mm tritanium button

## 2024-04-24 NOTE — OCCUPATIONAL THERAPY INITIAL EVALUATION ADULT - LIGHT TOUCH SENSATION, RUE, REHAB EVAL
[address] [place]  Dept: [phone]  Dept Fax: [phone]: [phone]    Visit Date: 1/24/2023    Functionality Assessment/Goals Worksheet     On a scale of 0 (Does not Interfere) to 10 (Completely Interferes)     1. Which number describes how during the past week pain has interfered with       the following:  A. General Activity:  7  B. Mood: 6  C. Walking Ability:  6  D. Normal Work (Includes both work outside the home and housework):  8  E. Relations with Other People:   0  F. Sleep:   7  G. Enjoyment of Life:   5    2. Patient Prefers to Take their Pain Medications:     [x]  On a regular basis   []  Only when necessary    []  Does not take pain medications    3. What are the Patient's Goals/Expectations for Visiting Pain Management? []  Learn about my pain    []  Receive Medication   []  Physical Therapy     []  Treat Depression   []  Receive Injections    []  Treat Sleep   []  Deal with Anxiety and Stress   []  Treat Opoid Dependence/Addiction   []  Other:    HPI:   Korin Urias is a 36 y.o. female is here today for    Chief Complaint: Lumbar back pain and SI pain      F/U Lumbar RFA Bilateral L4-5,5-S1  11/14/2022 states receiving about 80% pain relief or benefit. Her job is what increases her pain. 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 She denies any injury or hitting elbow. Just overuse. She continues to have low back SI pain. She went to PT  4/6/2021- May 20/2021 mild relief lasted 2 weeks. Her main pain coplaint is her low back left SI hip pain RLE radicular s/s stops above the knee, feels burning deep bone pain . She has had to take extra Lyrica. She is unable to walk for exercise more than  5 minutes. She has bilateral foot pain she thinks she has heel spurs. She did see Podiatry for foot pain. He recommended orthotics foot inserts and split. Pain increases with bending, lifting, twisting , reaching, pushing, pulling, walking, standing, stairs and getting up and down. Treatments Tried PT, ice, heat, NSAIDS, narcotics, muscle relaxer, OTC rubs creams patches, steroid burst and injections  Pain Description sharp, shooting, stabbing, burning, aching, numbness, tingling and pins and needles  She complains of any ER visits or new health issues since last visit. COVID  Bronchitis URI  Biceps tendinitis Sinusitis     Pain scale with out pain medications or at its worst is 6/10. Pain scale with pain medications or at its best is 3/10. Prior Injections:    She has had right hip steroid injection 3/68116 with 50% pain relief for 4 months starting to wear off now. Lumbar Facet MBB #2@  L4-5,5-S1 Bilateral  5/31/2022 states she received about 80% pain relief for 2-3 months then slowly wore off     Lumbar Facet MBB #1  L4-5,5-S1 Bilateral  7/22/2021  States she received about 80% pain relief for 3 weeks. Radiology:    Lumbar MRI  1/2022  FINDINGS:   There is anatomic vertebral body height and alignment. There are a few scattered focal areas of hyperintense T1 and T2 signal in the lumbar vertebral column is evidence for hemangiomas. Marrow signal is otherwise within normal limits. The conus    terminates in a normal fashion at the L1 level. The cauda equina is normal in appearance without nerve root thickening or clumping identified. Paraspinal soft tissues are unremarkable. At T12-L1 through L2-3 the intervertebral discs are normal in appearance. There is no significant spinal canal or neuroforaminal stenosis. At L3-4 there is no significant spinal canal stenosis. There is mild bilateral neural foraminal stenosis from mild facet hypertrophy and ligament flavum thickening.    At L4-5 there is a minimal disc bulge without significant spinal canal stenosis and mild bilateral neural foraminal stenosis in association with mild facet hypertrophy and ligament flavum thickening. At L5-S1 there is no significant spinal canal or neuroforaminal stenosis. Impression    Minimal degenerative changes at the lower lumbar spine without significant spinal canal or neuroforaminal stenosis at any lumbar level. The patientis allergic to furosemide. Subjective:      Review of Systems   Constitutional:  Positive for activity change. Negative for appetite change, chills, diaphoresis, fatigue, fever and unexpected weight change. HENT:  Negative for congestion, ear pain, hearing loss, mouth sores, nosebleeds, rhinorrhea, sinus pressure and sore throat. Eyes:  Negative for photophobia, pain and visual disturbance. Respiratory:  Negative for cough, chest tightness, shortness of breath and wheezing. COPD ASTHMA   Cardiovascular:  Negative for chest pain and palpitations. Gastrointestinal:  Negative for abdominal pain, constipation, diarrhea, nausea and vomiting. GERD   Endocrine: Negative for cold intolerance, heat intolerance, polydipsia, polyphagia and polyuria. Genitourinary:  Negative for decreased urine volume, difficulty urinating, frequency and hematuria. Musculoskeletal:  Positive for arthralgias, back pain, gait problem and myalgias. Negative for joint swelling, neck pain and neck stiffness. Skin:  Negative for color change and rash. Allergic/Immunologic: Negative for food allergies and immunocompromised state. Neurological:  Negative for dizziness, tremors, seizures, syncope, facial asymmetry, speech difficulty, weakness, light-headedness, numbness and headaches. Hematological:  Does not bruise/bleed easily. Psychiatric/Behavioral:  Negative for agitation, behavioral problems, confusion, decreased concentration, dysphoric mood, hallucinations, self-injury, sleep disturbance and suicidal ideas. The patient is nervous/anxious. The patient is not hyperactive. Depression anxiety      Objective:     Vitals:    01/24/23 1414   BP: 126/72   Site: Left Upper Arm   Position: Sitting   Cuff Size: Medium Adult   Pulse: 70   Weight: 168 lb (76.2 kg)   Height: 5' 1\" (1.549 m)       Physical Exam  Vitals and nursing note reviewed. Constitutional:       General: She is not in acute distress. Appearance: She is well-developed. She is not diaphoretic. HENT:      Head: Normocephalic and atraumatic. Right Ear: External ear normal.      Left Ear: External ear normal.      Nose: Nose normal.      Mouth/Throat:      Pharynx: No oropharyngeal exudate. Eyes:      General: No scleral icterus. Right eye: No discharge. Left eye: No discharge. Conjunctiva/sclera: Conjunctivae normal.      Pupils: Pupils are equal, round, and reactive to light. Neck:      Thyroid: No thyromegaly. Cardiovascular:      Rate and Rhythm: Normal rate and regular rhythm. Heart sounds: Normal heart sounds. No murmur heard. No friction rub. No gallop. Pulmonary:      Effort: Pulmonary effort is normal. No respiratory distress. Breath sounds: Normal breath sounds. No wheezing or rales. Chest:      Chest wall: No tenderness. Abdominal:      General: Bowel sounds are normal. There is no distension. Palpations: Abdomen is soft. Tenderness: There is no abdominal tenderness. There is no guarding or rebound. Musculoskeletal:         General: Tenderness present. Right shoulder: Normal.      Left shoulder: Normal.      Cervical back: Neck supple. Tenderness and bony tenderness present. No edema, erythema or rigidity. Pain with movement and muscular tenderness present. Decreased range of motion. Thoracic back: Spasms, tenderness and bony tenderness present. Decreased range of motion. Lumbar back: Spasms, tenderness and bony tenderness present. Decreased range of motion. Back:       Right hip: Tenderness and bony tenderness present. Decreased range of motion. Decreased strength. Left hip: Tenderness present. Right knee: Tenderness present. Left knee: Tenderness present. Right ankle: Tenderness present. Left ankle: Tenderness present. Right foot: Tenderness and bony tenderness present. Left foot: Tenderness and bony tenderness present. Skin:     General: Skin is warm. Coloration: Skin is not pale. Findings: No erythema or rash. Neurological:      Mental Status: She is alert and oriented to person, place, and time. She is not disoriented. Cranial Nerves: No cranial nerve deficit. Sensory: Sensation is intact. No sensory deficit. Motor: Motor function is intact. No atrophy or abnormal muscle tone. Coordination: Coordination is intact. Coordination normal.      Gait: Gait abnormal.      Deep Tendon Reflexes: Babinski sign absent on the right side. Reflex Scores:       Tricep reflexes are 2+ on the right side and 2+ on the left side. Bicep reflexes are 2+ on the right side and 2+ on the left side. Brachioradialis reflexes are 2+ on the right side and 2+ on the left side. Patellar reflexes are 1+ on the right side and 2+ on the left side. Achilles reflexes are 1+ on the right side and 2+ on the left side. Psychiatric:         Attention and Perception: Attention and perception normal. She is attentive. Mood and Affect: Mood and affect normal. Mood is not anxious or depressed. Affect is not labile, blunt, angry or inappropriate. Speech: Speech normal. She is communicative. Speech is not rapid and pressured, delayed, slurred or tangential.         Behavior: Behavior normal. Behavior is not agitated, slowed, aggressive, withdrawn, hyperactive or combative. Behavior is cooperative. Thought Content:  Thought content normal. Thought content is not paranoid or delusional. Thought content does not include homicidal or suicidal ideation. Thought content does not include homicidal or suicidal plan. Cognition and Memory: Cognition and memory normal. Memory is not impaired. She does not exhibit impaired recent memory or impaired remote memory. Judgment: Judgment normal. Judgment is not impulsive or inappropriate. Comments: anxiety depression      JOSE  Patricks test  positive  Yeoman's  or Gaenslen's positive  Kemps  positive       Assessment:     1. Lumbar spondylosis    2. Thoracic spondylosis    3. Spinal stenosis of lumbar region, unspecified whether neurogenic claudication present    4. SI (sacroiliac) joint inflammation (HCC)    5. Cervical spondylosis    6. Chronic pain syndrome    7. Lumbosacral radiculitis    8. DDD (degenerative disc disease), lumbar    9. Right lateral epicondylitis            Plan:       Testing Labs or Radiology reviewed: lumbar   Testing Labs or Radiology ordered:  Procedures: discussed right epicondyl injection   Discussed with patient about risks with procedure including infection, reaction to medication, increased pain, or bleeding. Medications:Medrol dose pack Neurontin  STOP Motrin  while taking Steroids then restart   Encouraged ice and  elbow brace        Meds. Prescribed:   Orders Placed This Encounter   Medications    methylPREDNISolone (MEDROL DOSEPACK) 4 MG tablet     Sig: Take by mouth. Dispense:  1 kit     Refill:  0         Return in about 3 months (around 4/24/2023).                Electronically signed by EDNA Dumont CNP on1/24/2023 at 2:35 PM within normal limits

## 2024-04-24 NOTE — DISCHARGE NOTE PROVIDER - NSDCHC_MEDRECSTATUS_GEN_ALL_CORE
Balloon inserted to circumflex and middle circumflex. Admission Reconciliation is Completed  Discharge Reconciliation is Not Complete Admission Reconciliation is Completed  Discharge Reconciliation is Completed

## 2024-04-24 NOTE — DISCHARGE NOTE PROVIDER - NSDCCPCAREPLAN_GEN_ALL_CORE_FT
PRINCIPAL DISCHARGE DIAGNOSIS  Diagnosis: Primary osteoarthritis of right knee  Assessment and Plan of Treatment: right TKA w/ jina guidance  Physical Therapy/Occupational Therapy for: ambulation, transfers, stairs, ADL's (activities of daily living), range of motion exercises, and isometrics  -Activity  • Weight Bearing as tolerated with rolling walker.  • Take short, frequent walks increasing the distance that you walk each day as tolerated.  • Change your position every hour to decrease pain and stiffness.  • Continue the exercises taught to you by your physical therapist.  • No driving until cleared by the doctor.  • No tub baths, hot tubs, or swimming pools until instructed by your doctor.  • Do not squat down on the floor.  • Do not kneel or twist your knee.  • Range of Motion Goals: Flexion= 120 degrees, Extension = 0 degrees  Keep incision clean and dry. You have a QUYNH dressing. You may shower. Disconnect QUYNH battery prior to showering. Reconnect battery after showering and press orange button to resume QUYNH power. Remove QUYNH dressing on post-op day #7.  Keep incision clean. DO NOT APPLY ANYTHING to incision site (salves/ointments/creams). Do not scrub incision site. Pat dry after shower.  Use cryocuff for 20 minute sessions w/ at least 1 hr between sessions.  Use a towel or washcloth under cuff; don't place directly on the skin.  Opioid safety :  Do not keep opioid in the medicine cabinet in bathroom or on kitchen counter where others may have access to it.  Do not drive while taking opioid.  To dispose of it some pharmacies do have drop boxes as do police stations.  Do not flush or put in trash.

## 2024-04-24 NOTE — DISCHARGE NOTE PROVIDER - PROVIDER TOKENS
PROVIDER:[TOKEN:[[id number]],SCHEDULEDAPPT:[05/07/2024],SCHEDULEDAPPTTIME:[11:00 AM],ESTABLISHEDPATIENT:[T]]

## 2024-04-24 NOTE — PHYSICAL THERAPY INITIAL EVALUATION ADULT - PERTINENT HX OF CURRENT PROBLEM, REHAB EVAL
Pt is a 78 y/o female with right knee primary OA. Pt is s/p right total knee replacement on 4/24/24.

## 2024-04-24 NOTE — CARE COORDINATION ASSESSMENT. - NSPASTMEDSURGHISTORY_GEN_ALL_CORE_FT
PAST MEDICAL & SURGICAL HISTORY:  H/O varicose veins      DVT, lower extremity      H/O low back pain      Osteoarthritis      Emphysema of lung      Urinary incontinence      Hyperlipidemia      Hypothyroid      S/P eye surgery      S/P cataract surgery      S/P foot surgery, left      S/P right knee arthroscopy      S/P laparoscopic cholecystectomy      S/P  section      S/P ovarian cystectomy      S/P appendectomy      S/P tonsillectomy

## 2024-04-24 NOTE — PATIENT PROFILE ADULT - FALL HARM RISK - RISK INTERVENTIONS

## 2024-04-24 NOTE — DISCHARGE NOTE NURSING/CASE MANAGEMENT/SOCIAL WORK - NSSCNAMETXT_GEN_ALL_CORE
[facility] care agency [phone] will reach out to you within 24-72 hours of your discharge to schedule home care visit/eval appointment with you. Please call agency for any queries regarding home care services

## 2024-04-24 NOTE — DISCHARGE NOTE PROVIDER - NSDCMRMEDTOKEN_GEN_ALL_CORE_FT
Myrbetriq 50 mg oral tablet, extended release: 1 tab(s) orally once a day  Striverdi Respimat 2.5 mcg/inh inhalation aerosol: 2 inhaled once a day  Synthroid 112 mcg (0.112 mg) oral tablet: 1 tab(s) orally once a day  Zocor 40 mg oral tablet: 1 tab(s) orally once a day (at bedtime)   Aspirin EC 81 mg oral delayed release tablet: 1 tab(s) orally every 12 hours Begin once Eliquis regimen is complete. Take Aspirin 2 hours before celebrex  CeleBREX 200 mg oral capsule: 1 cap(s) orally every 12 hours Take 2 hours after aspirin  Eliquis 2.5 mg oral tablet: 1 tab(s) orally every 12 hours Take for the first 14 days after surgery. When complete, may start aspirin regimen  Myrbetriq 50 mg oral tablet, extended release: 1 tab(s) orally once a day  Striverdi Respimat 2.5 mcg/inh inhalation aerosol: 2 inhaled once a day  Synthroid 112 mcg (0.112 mg) oral tablet: 1 tab(s) orally once a day  Zocor 40 mg oral tablet: 1 tab(s) orally once a day (at bedtime)   Aspirin EC 81 mg oral delayed release tablet: 1 tab(s) orally every 12 hours Begin once Eliquis regimen is complete. Take Aspirin 2 hours before celebrex  CeleBREX 200 mg oral capsule: 1 cap(s) orally every 12 hours Take 2 hours after aspirin  Eliquis 2.5 mg oral tablet: 1 tab(s) orally every 12 hours Take for the first 14 days after surgery. When complete, may start aspirin regimen  Myrbetriq 50 mg oral tablet, extended release: 1 tab(s) orally once a day  omeprazole 20 mg oral delayed release capsule: 1 cap(s) orally once a day  Striverdi Respimat 2.5 mcg/inh inhalation aerosol: 2 inhaled once a day  Synthroid 112 mcg (0.112 mg) oral tablet: 1 tab(s) orally once a day  Zocor 40 mg oral tablet: 1 tab(s) orally once a day (at bedtime)   acetaminophen 500 mg oral tablet: 2 tab(s) orally every 8 hours  Aspirin EC 81 mg oral delayed release tablet: 1 tab(s) orally every 12 hours Begin once Eliquis regimen is complete. Take Aspirin 2 hours before celebrex  CeleBREX 200 mg oral capsule: 1 cap(s) orally every 12 hours Take 2 hours after aspirin  Eliquis 2.5 mg oral tablet: 1 tab(s) orally every 12 hours Take for the first 14 days after surgery. When complete, may start aspirin regimen  Myrbetriq 50 mg oral tablet, extended release: 1 tab(s) orally once a day  omeprazole 20 mg oral delayed release capsule: 1 cap(s) orally once a day  polyethylene glycol 3350 oral powder for reconstitution: 17 gram(s) orally once a day (at bedtime)  senna leaf extract oral tablet: 2 tab(s) orally once a day (at bedtime)  Striverdi Respimat 2.5 mcg/inh inhalation aerosol: 2 inhaled once a day  Synthroid 112 mcg (0.112 mg) oral tablet: 1 tab(s) orally once a day  Zocor 40 mg oral tablet: 1 tab(s) orally once a day (at bedtime)   acetaminophen 500 mg oral tablet: 2 tab(s) orally every 8 hours  Aspirin EC 81 mg oral delayed release tablet: 1 tab(s) orally every 12 hours Begin once Eliquis regimen is complete. Take Aspirin 2 hours before celebrex  CeleBREX 200 mg oral capsule: 1 cap(s) orally every 12 hours Take 2 hours after aspirin  Eliquis 2.5 mg oral tablet: 1 tab(s) orally every 12 hours Take for the first 14 days after surgery. When complete, may start aspirin regimen  Myrbetriq 50 mg oral tablet, extended release: 1 tab(s) orally once a day  omeprazole 20 mg oral delayed release capsule: 1 cap(s) orally once a day  polyethylene glycol 3350 oral powder for reconstitution: 17 gram(s) orally once a day (at bedtime)  senna leaf extract oral tablet: 2 tab(s) orally once a day (at bedtime)  Striverdi Respimat 2.5 mcg/inh inhalation aerosol: 2 inhaled once a day  Synthroid 112 mcg (0.112 mg) oral tablet: 1 tab(s) orally once a day  traMADol 50 mg oral tablet: 1 tab(s) orally every 4 hours as needed for  moderate pain or 2 tabs every 6 hrs as needed for severe pain    [id number] MDD: 6  Zocor 40 mg oral tablet: 1 tab(s) orally once a day (at bedtime)

## 2024-04-24 NOTE — BRIEF OPERATIVE NOTE - NSICDXBRIEFPROCEDURE_GEN_ALL_CORE_FT
PROCEDURES:  Arthroplasty, knee, total, robot-assisted, using Grover system 24-Apr-2024 09:56:22 right knee Leslie Rodriguez

## 2024-04-24 NOTE — DISCHARGE NOTE NURSING/CASE MANAGEMENT/SOCIAL WORK - NSDCPEFALRISK_GEN_ALL_CORE
For information on Fall & Injury Prevention, visit: [email or web address] OR  [email or web address] OR  https://www.cdc.gov/steadi/patient.html

## 2024-04-24 NOTE — CARE COORDINATION ASSESSMENT. - NSCAREPROVIDERS_GEN_ALL_CORE_FT
CARE PROVIDERS:  Administration: Maureen Lan  Admitting: Marco Hall  Attending: Marco Hall  Consultant: Hemanth Kelly  Covering Team: PHILOMENA Lombardi  Nurse: Lotus Barcenas  Nurse: Ivy Schafer  Nurse: Matt Farooq  Nurse: Lotus Levy  Nurse: Estelita Simmons  Nurse: Filomena Bach  Nurse: Ariana Westbrook  Override: Lotus Levy  Physical Therapy: Criselda Russo  Physical Therapy: Khadra Leyva  Physical Therapy: Carol Cisse  Primary Team: Westley French  Primary Team: Leslie Rodriguez  Primary Team: Camryn Marrero  : Naya Barkley  Student: Viki Carrizales  Team: MABEL BURTON Hospitalists, Team  UR// Supp. Assoc.: Eileen Call

## 2024-04-24 NOTE — DISCHARGE NOTE PROVIDER - HOSPITAL COURSE
The patient is a 79  y.o. female with severe osteoarthritis of the right knee.  She was evaluated by the PST dept at [facility] and obtained the appropriate medical clearances.  After admission on 4/24/24 and receiving pre-operative parenteral prophylactic antibiotics (ancef vanco (nasal mrsa+)), the patient  underwent an  uncomplicated right TKA w/ jina guidance  A medical consultation from the Hospitalist service was obtained for post-operative medical co-management. Typical Physical & occupational therapy modalities post TKA were performed including ambulation training, range of motion, ADL's, and transfers. Eliquis 2.5 mg every 12 hrs, along w/ bilat venodynes, was given for DVT prophylaxis (caprini 15).  The patient had a clean appearing surgical incision with no sign of surgical site infections and had a stable neuro / vascular exam of the operated extremity.  After progression of mobility guided by the PT/ OT staff,  the patient was felt to benefit from further rehabilitative care for restoration to level of function. This was felt to best be accomplished at  home w/ home care (PT).  Discharge and Orthopedic Care instructions were delineated in the Discharge Plan and reviewed with the patient. All medications were delineated in the medication reconciliation tool and key points were reviewed with the patient. They were deemed stable from an Orthopedic & medical standpoint for discharge today.  Upon completion of Home care she will be  following up with Dr. Hall for office  follow up Orthopedic care.

## 2024-04-24 NOTE — DISCHARGE NOTE NURSING/CASE MANAGEMENT/SOCIAL WORK - CAREGIVER ADDRESS
ADD (attention deficit disorder)    Osteoma    RSV bronchiolitis  as a child  Unspecified fracture of shaft of left radius, subsequent encounter for closed fracture with routine healing same

## 2024-04-24 NOTE — PROGRESS NOTE ADULT - SUBJECTIVE AND OBJECTIVE BOX
Post Op Note- POD #0    SUBJECTIVE    80yo Female status post right TKR.  Patient is alert and comfortable  Pain is well controlled. Rate pain a 0 on a scale of 0-10.    Denies chest pain/shortness of breath/nausea/vomiting.     OBJECTIVE    T(C): 36.7 (04-24-24 @ 12:06), Max: 36.7 (04-24-24 @ 12:06)  HR: 66 (04-24-24 @ 12:06) (62 - 83)  BP: 115/54 (04-24-24 @ 12:06) (95/50 - 154/70)  RR: 18 (04-24-24 @ 12:06) (13 - 19)  SpO2: 100% (04-24-24 @ 12:06) (94% - 100%)  Wt(kg): --      04-24 @ 07:01  -  04-24 @ 13:55  --------------------------------------------------------  IN: 1220 mL / OUT: 50 mL / NET: 1170 mL        PHYSICAL EXAM    Right knee primary dressing C/D/I  Sensation Intact to Light Touch distally  Motor function DF/PF/EHL grossly intact  Toes warm and pink, capillary refill <2 sec.   Calves soft/nontender bilaterally       ASSESSMENT AND PLAN    - Orthopedically stable  - DVT prophylaxis: PAS + Eliquis 2.5 mg Q12h  - OOB as tolerated with PT/OT  - Pain control as clinically indicated  - Medicine to follow   - Continue antibiotics as per SCIP protocol  - Follow up Labs  - Incentive spirometry Post Op Note- POD #0    SUBJECTIVE    78yo Female status post right TKR.  Patient is alert and comfortable  Pain is well controlled. Rate pain a 0 on a scale of 0-10.    Denies chest pain/shortness of breath/nausea/vomiting.     OBJECTIVE    T(C): 36.7 (04-24-24 @ 12:06), Max: 36.7 (04-24-24 @ 12:06)  HR: 66 (04-24-24 @ 12:06) (62 - 83)  BP: 115/54 (04-24-24 @ 12:06) (95/50 - 154/70)  RR: 18 (04-24-24 @ 12:06) (13 - 19)  SpO2: 100% (04-24-24 @ 12:06) (94% - 100%)  Wt(kg): --      04-24 @ 07:01  -  04-24 @ 13:55  --------------------------------------------------------  IN: 1220 mL / OUT: 50 mL / NET: 1170 mL        PHYSICAL EXAM    Right knee primary dressing C/D/I  Sensation Intact to Light Touch distally  Motor function DF/PF/EHL grossly intact  Toes warm and pink, capillary refill <2 sec.   Calves soft/nontender bilaterally       ASSESSMENT AND PLAN    - Orthopedically stable  - DVT prophylaxis: PAS + Eliquis 2.5 mg Q12h  - OOB as tolerated with PT/OT  - Pain control as clinically indicated  - Medicine to follow   - Continue antibiotics as per SCIP protocol  - Follow up Labs  - Incentive spirometry  - Discharge home likely tomorrow

## 2024-04-24 NOTE — DISCHARGE NOTE PROVIDER - NSDCCPTREATMENT_GEN_ALL_CORE_FT
PRINCIPAL PROCEDURE  Procedure: Arthroplasty, knee, total, robot-assisted, using Grover system  Findings and Treatment: right knee  osteoarthritis right knee

## 2024-04-24 NOTE — PHYSICAL THERAPY INITIAL EVALUATION ADULT - ADDITIONAL COMMENTS
Pt lives with  in a private house, there are 3+3 stairs +HR to enter and no stairs to negotiate within. Pt has a walk in shower. PTA pt was independent with ADLs and ambulation with straight cane. Pt owns a RW, commode and cane.

## 2024-04-24 NOTE — DISCHARGE NOTE PROVIDER - NSDCFUSCHEDAPPT_GEN_ALL_CORE_FT
Marco Hall  [facility]  [address]  Scheduled Appointment: 04/24/2024    Maroc Hall  [facility]  [address]  Scheduled Appointment: 05/07/2024     Marco Hall  [facility]  [address] [facility]  Scheduled Appointment: 05/07/2024

## 2024-04-24 NOTE — OCCUPATIONAL THERAPY INITIAL EVALUATION ADULT - LIVES WITH, PROFILE
Pt lives with her  in a private home, 3+3 steps to enter, 0 steps inside with a walk in shower. Pt was independent with a cane prior to admission. Pt owns a RW and commode./spouse

## 2024-04-24 NOTE — PHYSICAL THERAPY INITIAL EVALUATION ADULT - GAIT DEVIATIONS NOTED, PT EVAL
decreased lydia/increased time in double stance/decreased step length/decreased weight-shifting ability

## 2024-04-25 VITALS
SYSTOLIC BLOOD PRESSURE: 138 MMHG | HEART RATE: 69 BPM | DIASTOLIC BLOOD PRESSURE: 67 MMHG | RESPIRATION RATE: 18 BRPM | TEMPERATURE: 98 F | OXYGEN SATURATION: 97 %

## 2024-04-25 LAB
ANION GAP SERPL CALC-SCNC: 7 MMOL/L — SIGNIFICANT CHANGE UP (ref 5–17)
BUN SERPL-MCNC: 11 MG/DL — SIGNIFICANT CHANGE UP (ref 7–23)
CALCIUM SERPL-MCNC: 8.9 MG/DL — SIGNIFICANT CHANGE UP (ref 8.4–10.5)
CHLORIDE SERPL-SCNC: 106 MMOL/L — SIGNIFICANT CHANGE UP (ref 96–108)
CO2 SERPL-SCNC: 28 MMOL/L — SIGNIFICANT CHANGE UP (ref 22–31)
CREAT SERPL-MCNC: 0.8 MG/DL — SIGNIFICANT CHANGE UP (ref 0.5–1.3)
EGFR: 75 ML/MIN/1.73M2 — SIGNIFICANT CHANGE UP
GLUCOSE SERPL-MCNC: 156 MG/DL — HIGH (ref 70–99)
HCT VFR BLD CALC: 36.9 % — SIGNIFICANT CHANGE UP (ref 34.5–45)
HGB BLD-MCNC: 12.4 G/DL — SIGNIFICANT CHANGE UP (ref 11.5–15.5)
MCHC RBC-ENTMCNC: 30.4 PG — SIGNIFICANT CHANGE UP (ref 27–34)
MCHC RBC-ENTMCNC: 33.6 GM/DL — SIGNIFICANT CHANGE UP (ref 32–36)
MCV RBC AUTO: 90.4 FL — SIGNIFICANT CHANGE UP (ref 80–100)
NRBC # BLD: 0 /100 WBCS — SIGNIFICANT CHANGE UP (ref 0–0)
PLATELET # BLD AUTO: 148 K/UL — LOW (ref 150–400)
POTASSIUM SERPL-MCNC: 4.3 MMOL/L — SIGNIFICANT CHANGE UP (ref 3.5–5.3)
POTASSIUM SERPL-SCNC: 4.3 MMOL/L — SIGNIFICANT CHANGE UP (ref 3.5–5.3)
RBC # BLD: 4.08 M/UL — SIGNIFICANT CHANGE UP (ref 3.8–5.2)
RBC # FLD: 12.7 % — SIGNIFICANT CHANGE UP (ref 10.3–14.5)
SODIUM SERPL-SCNC: 141 MMOL/L — SIGNIFICANT CHANGE UP (ref 135–145)
WBC # BLD: 11.92 K/UL — HIGH (ref 3.8–10.5)
WBC # FLD AUTO: 11.92 K/UL — HIGH (ref 3.8–10.5)

## 2024-04-25 PROCEDURE — C1713: CPT

## 2024-04-25 PROCEDURE — S2900: CPT

## 2024-04-25 PROCEDURE — 99232 SBSQ HOSP IP/OBS MODERATE 35: CPT

## 2024-04-25 PROCEDURE — 85027 COMPLETE CBC AUTOMATED: CPT

## 2024-04-25 PROCEDURE — C1776: CPT

## 2024-04-25 PROCEDURE — 97110 THERAPEUTIC EXERCISES: CPT

## 2024-04-25 PROCEDURE — 36415 COLL VENOUS BLD VENIPUNCTURE: CPT

## 2024-04-25 PROCEDURE — 94664 DEMO&/EVAL PT USE INHALER: CPT

## 2024-04-25 PROCEDURE — 80048 BASIC METABOLIC PNL TOTAL CA: CPT

## 2024-04-25 PROCEDURE — 97116 GAIT TRAINING THERAPY: CPT

## 2024-04-25 PROCEDURE — 97535 SELF CARE MNGMENT TRAINING: CPT

## 2024-04-25 PROCEDURE — 97165 OT EVAL LOW COMPLEX 30 MIN: CPT

## 2024-04-25 PROCEDURE — 27447 TOTAL KNEE ARTHROPLASTY: CPT

## 2024-04-25 PROCEDURE — 97530 THERAPEUTIC ACTIVITIES: CPT

## 2024-04-25 PROCEDURE — 97161 PT EVAL LOW COMPLEX 20 MIN: CPT

## 2024-04-25 PROCEDURE — 73560 X-RAY EXAM OF KNEE 1 OR 2: CPT

## 2024-04-25 RX ORDER — TRAMADOL HYDROCHLORIDE 50 MG/1
50 TABLET ORAL EVERY 4 HOURS
Refills: 0 | Status: DISCONTINUED | OUTPATIENT
Start: 2024-04-25 | End: 2024-04-25

## 2024-04-25 RX ORDER — TRAMADOL HYDROCHLORIDE 50 MG/1
1 TABLET ORAL
Qty: 42 | Refills: 0
Start: 2024-04-25

## 2024-04-25 RX ORDER — TRAMADOL HYDROCHLORIDE 50 MG/1
50 TABLET ORAL EVERY 6 HOURS
Refills: 0 | Status: DISCONTINUED | OUTPATIENT
Start: 2024-04-25 | End: 2024-04-25

## 2024-04-25 RX ORDER — TRAMADOL HYDROCHLORIDE 50 MG/1
100 TABLET ORAL EVERY 6 HOURS
Refills: 0 | Status: DISCONTINUED | OUTPATIENT
Start: 2024-04-25 | End: 2024-04-25

## 2024-04-25 RX ADMIN — PANTOPRAZOLE SODIUM 40 MILLIGRAM(S): 20 TABLET, DELAYED RELEASE ORAL at 05:26

## 2024-04-25 RX ADMIN — APIXABAN 2.5 MILLIGRAM(S): 2.5 TABLET, FILM COATED ORAL at 08:58

## 2024-04-25 RX ADMIN — Medication 112 MICROGRAM(S): at 05:26

## 2024-04-25 RX ADMIN — Medication 1000 MILLIGRAM(S): at 05:29

## 2024-04-25 RX ADMIN — Medication 101.6 MILLIGRAM(S): at 05:25

## 2024-04-25 RX ADMIN — CELECOXIB 200 MILLIGRAM(S): 200 CAPSULE ORAL at 09:12

## 2024-04-25 RX ADMIN — Medication 100 MILLIGRAM(S): at 00:51

## 2024-04-25 RX ADMIN — Medication 1000 MILLIGRAM(S): at 14:46

## 2024-04-25 RX ADMIN — SODIUM CHLORIDE 125 MILLILITER(S): 9 INJECTION, SOLUTION INTRAVENOUS at 02:34

## 2024-04-25 RX ADMIN — Medication 1000 MILLIGRAM(S): at 05:25

## 2024-04-25 RX ADMIN — CELECOXIB 200 MILLIGRAM(S): 200 CAPSULE ORAL at 08:58

## 2024-04-25 NOTE — PROGRESS NOTE ADULT - ASSESSMENT
79F with HLD, hypothyroidism, urinary incontinence POD#0 right TKA    POD#1 R TKA   Multimodal analgesia   Bowel regimen   PT/OT evaluations   VTE ppx    Incentive spirometry    Leukocytosis likely reactive, asymptomatic  Medically optimized for DC    Hypothyroidism   Continue synthroid 112mcg qAM     HTN   Continue zocor 40mg HS     Urinary incontinence  Continue myrbetriq 50mg qd

## 2024-04-25 NOTE — PROGRESS NOTE ADULT - SUBJECTIVE AND OBJECTIVE BOX
Eliquis 2.5mg q12h x 2 weeks  ASA EC 81mg q12h x 2 weeks  Celecoxib 200mg q12h x 2-3 weeks  Omeprazole 20mg QAM x 4 weeks  APAP 1000mg q8h x 2-3 weeks  Narcotic PRN  Docusate 100mg TID while taking narcotic  Miralax, Senna, or Bisacodyl PRN for treatment of constipation

## 2024-04-25 NOTE — PROGRESS NOTE ADULT - SUBJECTIVE AND OBJECTIVE BOX
Procedure: Right  TKR  POD #: 1  S: Pt without complaints. No SOB,CP, N/V. Tolerated Diet well.   Pain comfortable on tylenol.  didn't use oxy, prefers not to take it, so switched to tramadol   No BM yet, + flatus, No abdominal pain.  Pain Rx:  acetaminophen     Tablet .. 1000 milliGRAM(s) Oral every 8 hours  aprepitant 40 milliGRAM(s) Oral once  celecoxib 200 milliGRAM(s) Oral every 12 hours  HYDROmorphone  Injectable 0.5 milliGRAM(s) IV Push every 3 hours PRN  ondansetron Injectable 4 milliGRAM(s) IV Push every 6 hours PRN  traMADol 50 milliGRAM(s) Oral every 6 hours PRN  traMADol 50 milliGRAM(s) Oral every 4 hours PRN    O: General: On exam, No Apparent Distress  Vital Signs Last 24 Hrs  T(C): 36.6 (25 Apr 2024 07:51), Max: 36.8 (25 Apr 2024 03:12)  T(F): 97.8 (25 Apr 2024 07:51), Max: 98.3 (25 Apr 2024 03:12)  HR: 69 (25 Apr 2024 07:51) (62 - 78)  BP: 138/67 (25 Apr 2024 07:51) (95/50 - 169/74)  BP(mean): --  RR: 18 (25 Apr 2024 07:51) (13 - 18)  SpO2: 97% (25 Apr 2024 07:51) (94% - 100%)    Parameters below as of 25 Apr 2024 07:51  Patient On (Oxygen Delivery Method): room air        Lungs: BS clear bilat.  Heart: TRRR no murmur  Abdomen: + BS, soft , benign exam     Ext -- right knee ace removed.  viet dry and functioing  ROM: 0-50  Neurologic:  Has sensation over feet & toes bilat. Full AROM bilat feet & toes. EHL/AT = 5/5  Vascular: Feet toes warm, pink. DP = 2+ No calf tenderness bilat..  1+ pitting edema bilat  VTEP: On Bilat. Venodynes + apixaban 2.5 milliGRAM(s) Oral every 12 hours    I&O's Detail    24 Apr 2024 07:01 - 25 Apr 2024 07:00  --------------------------------------------------------  IN:    Lactated Ringers: 1100 mL    Oral Fluid: 120 mL  Total IN: 1220 mL    OUT:    Blood Loss (mL): 50 mL    Voided (mL): 2900 mL  Total OUT: 2950 mL    Total NET: -1730 mL          Activity in PT yesterday Noted. Walked 50'  Hospitalist input noted.  Labs Today:                         12.4   11.92 )-----------( 148      ( 25 Apr 2024 06:00 )             36.9       04-25    141  |  106  |  11  ----------------------------<  156<H>  4.3   |  28  |  0.80    Ca    8.9      25 Apr 2024 03:00        Primary Orthopedic Assessment:  • Stable from Orthopedic perspective  • Neuro motor exam stable  • Labs: CBC / Chem stable      Plan:   • Continue:  PT/OT/WBAT with assistance of a walker/Ice to knee/ Knee ROM         Incentive spirometry encouraged   • Continue DVT prophylaxis as prescribed, including use of compression devices and ankle pumps  • Continue Pain Rx  • Plans per Medicine   • Discharge planning – anticipated discharge is Home D/C with home care & home PT when medically stable & cleared by PT/OT  Opioid safety discussed w/ pt.  Do not keep opioid in the medicine cabinet in bathroom or on kitchen counter where others may have access to it.  Do not drive while taking opioid.  To dispose of it some pharmacies do have drop boxes as do police stations.  Do not flush or put in trash.

## 2024-04-25 NOTE — PHARMACOTHERAPY INTERVENTION NOTE - COMMENTS
Transition of Care video discharge education - medication calendar given to patient   reynaldo ghosh PharmD Candidate for Amber Vega, PharmD 
Admission medication reconciliation POD1

## 2024-04-25 NOTE — PROGRESS NOTE ADULT - SUBJECTIVE AND OBJECTIVE BOX
Patient is a 79y old  Female who presents with a chief complaint of TKA (25 Apr 2024 11:20)      INTERVAL HPI/OVERNIGHT EVENTS:    no overnight events      MEDICATIONS  (STANDING):  acetaminophen     Tablet .. 1000 milliGRAM(s) Oral every 8 hours  apixaban 2.5 milliGRAM(s) Oral every 12 hours  aprepitant 40 milliGRAM(s) Oral once  celecoxib 200 milliGRAM(s) Oral every 12 hours  lactated ringers. 1000 milliLiter(s) (125 mL/Hr) IV Continuous <Continuous>  levothyroxine 112 MICROGram(s) Oral daily  pantoprazole    Tablet 40 milliGRAM(s) Oral before breakfast  polyethylene glycol 3350 17 Gram(s) Oral at bedtime  senna 2 Tablet(s) Oral at bedtime  simvastatin 40 milliGRAM(s) Oral at bedtime    MEDICATIONS  (PRN):  albuterol    90 MICROgram(s) HFA Inhaler 2 Puff(s) Inhalation every 6 hours PRN Shortness of Breath and/or Wheezing  aluminum hydroxide/magnesium hydroxide/simethicone Suspension 30 milliLiter(s) Oral four times a day PRN Indigestion  HYDROmorphone  Injectable 0.5 milliGRAM(s) IV Push every 3 hours PRN Breakthrough pain  magnesium hydroxide Suspension 30 milliLiter(s) Oral daily PRN Constipation  ondansetron Injectable 4 milliGRAM(s) IV Push every 6 hours PRN Nausea and/or Vomiting  traMADol 50 milliGRAM(s) Oral every 4 hours PRN Moderate Pain (4 - 6)  traMADol 100 milliGRAM(s) Oral every 6 hours PRN Severe Pain (7 - 10)      Allergies    No Known Allergies    Intolerances        REVIEW OF SYSTEMS:  CONSTITUTIONAL: No fever, weight loss, or fatigue  EYES: No eye pain, visual disturbances, or discharge  ENMT:  No difficulty hearing, tinnitus, vertigo; No sinus or throat pain  NECK: No pain or stiffness  BREASTS: No pain, masses, or nipple discharge  RESPIRATORY: No cough, wheezing, chills or hemoptysis; No shortness of breath  CARDIOVASCULAR: No chest pain, palpitations, lightheadedness, or leg swelling  GASTROINTESTINAL: No abdominal or epigastric pain. No nausea, vomiting, or hematemesis; No diarrhea or constipation. No melena or hematochezia.  GENITOURINARY: No dysuria, frequency, hematuria, or incontinence  NEUROLOGICAL: No headaches, memory loss, vertigo, loss of strength, numbness, or tremors  SKIN: No itching, burning, rashes, or lesions   LYMPH NODES: No enlarged glands  ENDOCRINE: No heat or cold intolerance; No hair loss; No polydipsia or polyuria  MUSCULOSKELETAL: No joint pain or swelling; No muscle, back, or extremity pain  PSYCHIATRIC: No depression, anxiety, or mood swings  HEME/LYMPH: No easy bruising, or bleeding gums  ALLERGY AND IMMUNOLOGIC: No hives or eczema    Vital Signs Last 24 Hrs  T(C): 36.6 (25 Apr 2024 07:51), Max: 36.8 (25 Apr 2024 03:12)  T(F): 97.8 (25 Apr 2024 07:51), Max: 98.3 (25 Apr 2024 03:12)  HR: 69 (25 Apr 2024 07:51) (66 - 78)  BP: 138/67 (25 Apr 2024 07:51) (115/54 - 169/74)  BP(mean): --  RR: 18 (25 Apr 2024 07:51) (16 - 18)  SpO2: 97% (25 Apr 2024 07:51) (94% - 100%)    Parameters below as of 25 Apr 2024 07:51  Patient On (Oxygen Delivery Method): room air        PHYSICAL EXAM:  GENERAL: NAD, well-groomed, well-developed  HEAD:  Atraumatic, Normocephalic  EYES: EOMI, PERRLA, conjunctiva and sclera clear  ENMT: Moist mucous membranes, No lesions; No tonsillar erythema, exudates, or enlargement  NECK: Supple, No JVD, Normal thyroid  NERVOUS SYSTEM:  Alert & Oriented X3, Good concentration; All 4 extremities mobile, no gross sensory deficits.   CHEST/LUNG: Clear to auscultation bilaterally; No rales, rhonchi, wheezing, or rubs  HEART: Regular rate and rhythm; No murmurs, rubs, or gallops  ABDOMEN: Soft, Nontender, Nondistended; Bowel sounds present  EXTREMITIES:  2+ Peripheral Pulses, No clubbing, cyanosis, or edema  LYMPH: No lymphadenopathy noted  SKIN: No rashes or lesions    LABS:                        12.4   11.92 )-----------( 148      ( 25 Apr 2024 06:00 )             36.9     25 Apr 2024 03:00    141    |  106    |  11     ----------------------------<  156    4.3     |  28     |  0.80     Ca    8.9        25 Apr 2024 03:00        Urinalysis Basic - ( 25 Apr 2024 03:00 )    Color: x / Appearance: x / SG: x / pH: x  Gluc: 156 mg/dL / Ketone: x  / Bili: x / Urobili: x   Blood: x / Protein: x / Nitrite: x   Leuk Esterase: x / RBC: x / WBC x   Sq Epi: x / Non Sq Epi: x / Bacteria: x      CAPILLARY BLOOD GLUCOSE          RADIOLOGY & ADDITIONAL TESTS:

## 2024-04-29 ENCOUNTER — TRANSCRIPTION ENCOUNTER (OUTPATIENT)
Age: 79
End: 2024-04-29

## 2024-05-07 ENCOUNTER — TRANSCRIPTION ENCOUNTER (OUTPATIENT)
Age: 79
End: 2024-05-07

## 2024-05-07 ENCOUNTER — APPOINTMENT (OUTPATIENT)
Dept: ORTHOPEDIC SURGERY | Facility: CLINIC | Age: 79
End: 2024-05-07
Payer: MEDICARE

## 2024-05-07 PROBLEM — I82.409 ACUTE EMBOLISM AND THROMBOSIS OF UNSPECIFIED DEEP VEINS OF UNSPECIFIED LOWER EXTREMITY: Chronic | Status: ACTIVE | Noted: 2024-04-08

## 2024-05-07 PROBLEM — E03.9 HYPOTHYROIDISM, UNSPECIFIED: Chronic | Status: ACTIVE | Noted: 2024-04-08

## 2024-05-07 PROBLEM — M19.90 UNSPECIFIED OSTEOARTHRITIS, UNSPECIFIED SITE: Chronic | Status: ACTIVE | Noted: 2024-04-08

## 2024-05-07 PROBLEM — J43.9 EMPHYSEMA, UNSPECIFIED: Chronic | Status: ACTIVE | Noted: 2024-04-08

## 2024-05-07 PROBLEM — Z86.79 PERSONAL HISTORY OF OTHER DISEASES OF THE CIRCULATORY SYSTEM: Chronic | Status: ACTIVE | Noted: 2024-04-08

## 2024-05-07 PROBLEM — E78.5 HYPERLIPIDEMIA, UNSPECIFIED: Chronic | Status: ACTIVE | Noted: 2024-04-08

## 2024-05-07 PROBLEM — R32 UNSPECIFIED URINARY INCONTINENCE: Chronic | Status: ACTIVE | Noted: 2024-04-08

## 2024-05-07 PROBLEM — Z87.39 PERSONAL HISTORY OF OTHER DISEASES OF THE MUSCULOSKELETAL SYSTEM AND CONNECTIVE TISSUE: Chronic | Status: ACTIVE | Noted: 2024-04-08

## 2024-05-07 PROCEDURE — 73562 X-RAY EXAM OF KNEE 3: CPT | Mod: RT

## 2024-05-07 PROCEDURE — 99024 POSTOP FOLLOW-UP VISIT: CPT

## 2024-05-07 NOTE — PHYSICAL EXAM
[Right] : right knee [] : ligamentously stable [TWNoteComboBox7] : flexion 90 degrees [de-identified] : extension 0 degrees

## 2024-05-07 NOTE — ASSESSMENT
[FreeTextEntry1] : S/P RT TKA 4/24/24: NO F/C/S. DOING WELL. XRAYS REVIEWED WITH COMPONENTS WELL FIXED. NO SIGNS OF INFECTION. GOOD LIGAMENT BALANCE ON EXAM.  DISCUSSED THE IMPORTANCE OF ELEVATION TO REDUCE SWELLING. PROPER ELEVATION TECHNIQUES DISCUSSED. ABX AND PRECAUTIONS REVIEWED. PT RX. QUESTIONS ANSWERED.

## 2024-05-07 NOTE — HISTORY OF PRESENT ILLNESS
[7] : 7 [8] : 8 [Sharp] : sharp [] : yes [Nothing helps with pain getting better] : Nothing helps with pain getting better [Lying in bed] : lying in bed [de-identified] : Patient presents right knee pain. Pain keeps her up at night and she has to put a cushion underneath her knee. Received gel shots 5-6 years ago, helped very little. Nothing helps the pain improve.   PMHx   5/7/24: PO#1. DOS 4/24/24 R TKA  [FreeTextEntry7] : into right ankle

## 2024-05-08 RX ORDER — TRAMADOL HYDROCHLORIDE 50 MG/1
50 TABLET, COATED ORAL EVERY 6 HOURS
Qty: 30 | Refills: 0 | Status: ACTIVE | COMMUNITY
Start: 2024-05-08 | End: 1900-01-01

## 2024-05-13 ENCOUNTER — APPOINTMENT (OUTPATIENT)
Dept: NEUROLOGY | Facility: CLINIC | Age: 79
End: 2024-05-13

## 2024-06-04 ENCOUNTER — APPOINTMENT (OUTPATIENT)
Dept: ORTHOPEDIC SURGERY | Facility: CLINIC | Age: 79
End: 2024-06-04
Payer: MEDICARE

## 2024-06-04 VITALS — WEIGHT: 172 LBS | BODY MASS INDEX: 28.66 KG/M2 | HEIGHT: 65 IN

## 2024-06-04 DIAGNOSIS — Z96.651 PRESENCE OF RIGHT ARTIFICIAL KNEE JOINT: ICD-10-CM

## 2024-06-04 PROCEDURE — 99024 POSTOP FOLLOW-UP VISIT: CPT

## 2024-06-04 NOTE — HISTORY OF PRESENT ILLNESS
[7] : 7 [8] : 8 [Sharp] : sharp [] : yes [Nothing helps with pain getting better] : Nothing helps with pain getting better [Lying in bed] : lying in bed [de-identified] : Patient presents right knee pain. Pain keeps her up at night and she has to put a cushion underneath her knee. Received gel shots 5-6 years ago, helped very little. Nothing helps the pain improve.   PMHx   5/7/24: PO#1. DOS 4/24/24 R TKA  6/4/24: Right knee post op visit. Doing well. Going to PT which is helping.   [FreeTextEntry7] : into right ankle

## 2024-06-04 NOTE — PHYSICAL EXAM
[Right] : right knee [] : no tenderness [TWNoteComboBox7] : flexion 95 degrees [de-identified] : extension 3 degrees

## 2024-06-04 NOTE — ASSESSMENT
[FreeTextEntry1] : S/P RT TKA 4/24/24: NO F/C/S. DOING WELL. XRAYS REVIEWED WITH COMPONENTS WELL FIXED. NO SIGNS OF INFECTION. GOOD LIGAMENT BALANCE ON EXAM. ABX AND PRECAUTIONS AGAIN REVIEWED. PT RX. WILL CONTINUE HOME PT. QUESTIONS ANSWERED.

## 2024-06-06 ENCOUNTER — TRANSCRIPTION ENCOUNTER (OUTPATIENT)
Age: 79
End: 2024-06-06

## 2024-06-14 ENCOUNTER — NON-APPOINTMENT (OUTPATIENT)
Age: 79
End: 2024-06-14

## 2024-07-08 ENCOUNTER — NON-APPOINTMENT (OUTPATIENT)
Age: 79
End: 2024-07-08

## 2024-08-05 NOTE — CARE COORDINATION ASSESSMENT. - LIVES WITH, PROFILE
spouse
Patient is 75-year-old female who presents emergency department complaining of abnormal lab results.  Patient states that she was getting outpatient physical exam and lab work.  Patient states that she received a phone call that her potassium was low.  Of note, the patient states that she has had 2 weeks of fatigue, shaking and generalized weakness.  Patient denies any seizure.  Denies any increased thirst denies any shortness of breath, chest pain, palpitations, abdominal pain.

## 2024-09-10 ENCOUNTER — APPOINTMENT (OUTPATIENT)
Dept: ORTHOPEDIC SURGERY | Facility: CLINIC | Age: 79
End: 2024-09-10
Payer: MEDICARE

## 2024-09-10 DIAGNOSIS — Z96.651 PRESENCE OF RIGHT ARTIFICIAL KNEE JOINT: ICD-10-CM

## 2024-09-10 PROCEDURE — G2211 COMPLEX E/M VISIT ADD ON: CPT

## 2024-09-10 PROCEDURE — 99214 OFFICE O/P EST MOD 30 MIN: CPT

## 2024-09-10 NOTE — PHYSICAL EXAM
[Right] : right knee [] : no tenderness [TWNoteComboBox7] : flexion 100 degrees [de-identified] : extension 0 degrees

## 2024-09-10 NOTE — HISTORY OF PRESENT ILLNESS
[] : yes [Nothing helps with pain getting better] : Nothing helps with pain getting better [Lying in bed] : lying in bed [2] : 2 [de-identified] : Patient presents right knee pain. Pain keeps her up at night and she has to put a cushion underneath her knee. Received gel shots 5-6 years ago, helped very little. Nothing helps the pain improve.   PMHx   5/7/24: PO#1. DOS 4/24/24 R TKA  6/4/24: Right knee post op visit. Doing well. Going to PT which is helping.   09/10/2024: Patient here for follow up on right knee. DOS: 4.24.24. Patient states improvement since last visit [FreeTextEntry7] : into right ankle

## 2024-09-10 NOTE — ASSESSMENT
[FreeTextEntry1] : S/P RT TKA 4/24/24: NO F/C/S. DOING WELL. XRAYS REVIEWED WITH COMPONENTS WELL FIXED. NO SIGNS OF INFECTION. GOOD LIGAMENT BALANCE ON EXAM. WE AGAIN DISCUSSED ABX PPX FOR DENTAL PROCEDURES FOR AT LEAST 2 YEARS FROM SURGERY. QUESTIONS ANSWERED. SHE WILL CONTINUE WITH HEP.

## 2024-09-21 ENCOUNTER — NON-APPOINTMENT (OUTPATIENT)
Age: 79
End: 2024-09-21

## 2024-10-23 ENCOUNTER — EMERGENCY (EMERGENCY)
Facility: HOSPITAL | Age: 79
LOS: 1 days | Discharge: HOME | End: 2024-10-23
Attending: EMERGENCY MEDICINE
Payer: COMMERCIAL

## 2024-10-23 VITALS
SYSTOLIC BLOOD PRESSURE: 162 MMHG | DIASTOLIC BLOOD PRESSURE: 77 MMHG | OXYGEN SATURATION: 95 % | HEART RATE: 75 BPM | TEMPERATURE: 99 F | RESPIRATION RATE: 18 BRPM

## 2024-10-23 VITALS
HEART RATE: 74 BPM | OXYGEN SATURATION: 93 % | WEIGHT: 169.98 LBS | RESPIRATION RATE: 18 BRPM | TEMPERATURE: 99 F | DIASTOLIC BLOOD PRESSURE: 81 MMHG | SYSTOLIC BLOOD PRESSURE: 124 MMHG

## 2024-10-23 DIAGNOSIS — Z98.890 OTHER SPECIFIED POSTPROCEDURAL STATES: Chronic | ICD-10-CM

## 2024-10-23 DIAGNOSIS — Z90.49 ACQUIRED ABSENCE OF OTHER SPECIFIED PARTS OF DIGESTIVE TRACT: Chronic | ICD-10-CM

## 2024-10-23 DIAGNOSIS — Z90.89 ACQUIRED ABSENCE OF OTHER ORGANS: Chronic | ICD-10-CM

## 2024-10-23 DIAGNOSIS — Z98.49 CATARACT EXTRACTION STATUS, UNSPECIFIED EYE: Chronic | ICD-10-CM

## 2024-10-23 DIAGNOSIS — Z98.891 HISTORY OF UTERINE SCAR FROM PREVIOUS SURGERY: Chronic | ICD-10-CM

## 2024-10-23 LAB
ALBUMIN SERPL ELPH-MCNC: 3.7 G/DL — SIGNIFICANT CHANGE UP (ref 3.3–5.2)
ALP SERPL-CCNC: 81 U/L — SIGNIFICANT CHANGE UP (ref 40–120)
ALT FLD-CCNC: 20 U/L — SIGNIFICANT CHANGE UP
ANION GAP SERPL CALC-SCNC: 11 MMOL/L — SIGNIFICANT CHANGE UP (ref 5–17)
APPEARANCE UR: CLEAR — SIGNIFICANT CHANGE UP
AST SERPL-CCNC: 34 U/L — HIGH
BASOPHILS # BLD AUTO: 0.04 K/UL — SIGNIFICANT CHANGE UP (ref 0–0.2)
BASOPHILS NFR BLD AUTO: 0.7 % — SIGNIFICANT CHANGE UP (ref 0–2)
BILIRUB SERPL-MCNC: 0.4 MG/DL — SIGNIFICANT CHANGE UP (ref 0.4–2)
BILIRUB UR-MCNC: NEGATIVE — SIGNIFICANT CHANGE UP
BUN SERPL-MCNC: 8.1 MG/DL — SIGNIFICANT CHANGE UP (ref 8–20)
CALCIUM SERPL-MCNC: 8.8 MG/DL — SIGNIFICANT CHANGE UP (ref 8.4–10.5)
CHLORIDE SERPL-SCNC: 102 MMOL/L — SIGNIFICANT CHANGE UP (ref 96–108)
CO2 SERPL-SCNC: 25 MMOL/L — SIGNIFICANT CHANGE UP (ref 22–29)
COLOR SPEC: YELLOW — SIGNIFICANT CHANGE UP
CREAT SERPL-MCNC: 0.55 MG/DL — SIGNIFICANT CHANGE UP (ref 0.5–1.3)
DIFF PNL FLD: NEGATIVE — SIGNIFICANT CHANGE UP
EGFR: 93 ML/MIN/1.73M2 — SIGNIFICANT CHANGE UP
EOSINOPHIL # BLD AUTO: 0.01 K/UL — SIGNIFICANT CHANGE UP (ref 0–0.5)
EOSINOPHIL NFR BLD AUTO: 0.2 % — SIGNIFICANT CHANGE UP (ref 0–6)
GLUCOSE SERPL-MCNC: 137 MG/DL — HIGH (ref 70–99)
GLUCOSE UR QL: NEGATIVE MG/DL — SIGNIFICANT CHANGE UP
HCT VFR BLD CALC: 39.1 % — SIGNIFICANT CHANGE UP (ref 34.5–45)
HGB BLD-MCNC: 13 G/DL — SIGNIFICANT CHANGE UP (ref 11.5–15.5)
IMM GRANULOCYTES NFR BLD AUTO: 0.2 % — SIGNIFICANT CHANGE UP (ref 0–0.9)
KETONES UR-MCNC: NEGATIVE MG/DL — SIGNIFICANT CHANGE UP
LEUKOCYTE ESTERASE UR-ACNC: NEGATIVE — SIGNIFICANT CHANGE UP
LIDOCAIN IGE QN: 32 U/L — SIGNIFICANT CHANGE UP (ref 22–51)
LYMPHOCYTES # BLD AUTO: 1.17 K/UL — SIGNIFICANT CHANGE UP (ref 1–3.3)
LYMPHOCYTES # BLD AUTO: 20.6 % — SIGNIFICANT CHANGE UP (ref 13–44)
MCHC RBC-ENTMCNC: 30.2 PG — SIGNIFICANT CHANGE UP (ref 27–34)
MCHC RBC-ENTMCNC: 33.2 GM/DL — SIGNIFICANT CHANGE UP (ref 32–36)
MCV RBC AUTO: 90.7 FL — SIGNIFICANT CHANGE UP (ref 80–100)
MONOCYTES # BLD AUTO: 0.72 K/UL — SIGNIFICANT CHANGE UP (ref 0–0.9)
MONOCYTES NFR BLD AUTO: 12.7 % — SIGNIFICANT CHANGE UP (ref 2–14)
NEUTROPHILS # BLD AUTO: 3.72 K/UL — SIGNIFICANT CHANGE UP (ref 1.8–7.4)
NEUTROPHILS NFR BLD AUTO: 65.6 % — SIGNIFICANT CHANGE UP (ref 43–77)
NITRITE UR-MCNC: NEGATIVE — SIGNIFICANT CHANGE UP
PH UR: 6.5 — SIGNIFICANT CHANGE UP (ref 5–8)
PLATELET # BLD AUTO: 175 K/UL — SIGNIFICANT CHANGE UP (ref 150–400)
POTASSIUM SERPL-MCNC: 3.8 MMOL/L — SIGNIFICANT CHANGE UP (ref 3.5–5.3)
POTASSIUM SERPL-SCNC: 3.8 MMOL/L — SIGNIFICANT CHANGE UP (ref 3.5–5.3)
PROT SERPL-MCNC: 6.9 G/DL — SIGNIFICANT CHANGE UP (ref 6.6–8.7)
PROT UR-MCNC: NEGATIVE MG/DL — SIGNIFICANT CHANGE UP
RBC # BLD: 4.31 M/UL — SIGNIFICANT CHANGE UP (ref 3.8–5.2)
RBC # FLD: 13.2 % — SIGNIFICANT CHANGE UP (ref 10.3–14.5)
SODIUM SERPL-SCNC: 138 MMOL/L — SIGNIFICANT CHANGE UP (ref 135–145)
SP GR SPEC: 1.01 — SIGNIFICANT CHANGE UP (ref 1–1.03)
UROBILINOGEN FLD QL: 1 MG/DL — SIGNIFICANT CHANGE UP (ref 0.2–1)
WBC # BLD: 5.67 K/UL — SIGNIFICANT CHANGE UP (ref 3.8–10.5)
WBC # FLD AUTO: 5.67 K/UL — SIGNIFICANT CHANGE UP (ref 3.8–10.5)

## 2024-10-23 PROCEDURE — 81003 URINALYSIS AUTO W/O SCOPE: CPT

## 2024-10-23 PROCEDURE — 80053 COMPREHEN METABOLIC PANEL: CPT

## 2024-10-23 PROCEDURE — 84443 ASSAY THYROID STIM HORMONE: CPT

## 2024-10-23 PROCEDURE — 99284 EMERGENCY DEPT VISIT MOD MDM: CPT | Mod: 25

## 2024-10-23 PROCEDURE — 83690 ASSAY OF LIPASE: CPT

## 2024-10-23 PROCEDURE — 70450 CT HEAD/BRAIN W/O DYE: CPT | Mod: MC

## 2024-10-23 PROCEDURE — 84436 ASSAY OF TOTAL THYROXINE: CPT

## 2024-10-23 PROCEDURE — 99284 EMERGENCY DEPT VISIT MOD MDM: CPT

## 2024-10-23 PROCEDURE — 84480 ASSAY TRIIODOTHYRONINE (T3): CPT

## 2024-10-23 PROCEDURE — 85025 COMPLETE CBC W/AUTO DIFF WBC: CPT

## 2024-10-23 PROCEDURE — 87086 URINE CULTURE/COLONY COUNT: CPT

## 2024-10-23 PROCEDURE — 70450 CT HEAD/BRAIN W/O DYE: CPT | Mod: 26,MC

## 2024-10-23 PROCEDURE — 36415 COLL VENOUS BLD VENIPUNCTURE: CPT

## 2024-10-23 RX ORDER — SODIUM CHLORIDE 0.9 % (FLUSH) 0.9 %
1000 SYRINGE (ML) INJECTION ONCE
Refills: 0 | Status: COMPLETED | OUTPATIENT
Start: 2024-10-23 | End: 2024-10-23

## 2024-10-23 RX ADMIN — Medication 1000 MILLILITER(S): at 13:25

## 2024-10-23 NOTE — ED ADULT NURSE NOTE - NSFALLRISKINTERV_ED_ALL_ED

## 2024-10-23 NOTE — ED ADULT NURSE NOTE - OBJECTIVE STATEMENT
Pt is Axox3 c/o having an episode of confusion yesterday that caused concern from her pcp. Pt has pmhx of recurrent UTIS, and has been experiencing incontinence, pt is scheduled for a procedure to inject "botox" to help with these s/s. Denies increased urgency, pain and burning. Breathing even and unlabored. Pt is aware of plan of care.

## 2024-10-23 NOTE — ED PROVIDER NOTE - CLINICAL SUMMARY MEDICAL DECISION MAKING FREE TEXT BOX
79-year-old female hx of  thyroid disorder, right knee replacement, frequent UTIs presents with altered mental status.  son report that patient was confused yesterday.   Patient report intermittent dizziness feels room spinning but currently improved. Patient recently has seen a urologist scoped and is scheduled for Botox injection for urinary incontinence in December.  Patient was given Bactrim on 9/20 prior to the procedure due to recent knee surgery in April.  Patient has a prescription for Levaquin for any additional procedures that she will need.  Patient report dysuria, mild suprapubic abdominal pain, no chest pain, no nausea or vomiting.  Patient had low-grade fever at home.   Son report that patient is currently back to her baseline mental status.      No focal neurologic deficit on exam.  Will check blood work, urine, CT head.  IV fluid hydration. 79-year-old female hx of  thyroid disorder, right knee replacement, frequent UTIs presents with altered mental status.  son report that patient was confused yesterday.   Patient report intermittent dizziness feels room spinning but currently improved. Patient recently has seen a urologist scoped and is scheduled for Botox injection for urinary incontinence in December.  Patient was given Bactrim on 9/20 prior to the procedure due to recent knee surgery in April.  Patient has a prescription for Levaquin for any additional procedures that she will need.  Patient report dysuria, mild suprapubic abdominal pain, no chest pain, no nausea or vomiting.  Patient had low-grade fever at home.   Son report that patient is currently back to her baseline mental status.      No focal neurologic deficit on exam. Lab values do not require emergent intervention. no  Leukocytosis. Urinalysis demonstrates no acute pathology.  Urine culture pending. .CT head scan demonstrates no acute pathology.   IV fluid hydration.   Patient report feeling well.  Family report that patient has been coughing recently but cough improved.  No leukocytosis.  I  offer x-ray however patient report symptoms are improving and declining chest x-ray comfortable going home.

## 2024-10-23 NOTE — ED ADULT NURSE NOTE - HISTORY OF COVID-19 VACCINATION
Vaccine status unknown Localized Dermabrasion With Wire Brush Text: The patient was draped in routine manner.  Localized dermabrasion using 3 x 17 mm wire brush was performed in routine manner to papillary dermis. This spot dermabrasion is being performed to complete skin cancer reconstruction. It also will eliminate the other sun damaged precancerous cells that are known to be part of the regional effect of a lifetime's worth of sun exposure. This localized dermabrasion is therapeutic and should not be considered cosmetic in any regard. Localized Dermabrasion Text: The patient was draped in routine manner.  Localized dermabrasion using 3 x 17 mm wire brush was performed in routine manner to papillary dermis. This spot dermabrasion is being performed to complete skin cancer reconstruction. It also will eliminate the other sun damaged precancerous cells that are known to be part of the regional effect of a lifetime's worth of sun exposure. This localized dermabrasion is therapeutic and should not be considered cosmetic in any regard.

## 2024-10-23 NOTE — ED PROVIDER NOTE - NS ED ROS FT
CONSTITUTIONAL - no  fever, no diaphoresis, no weight change  SKIN - no rash  HEMATOLOGIC - no bleeding, no bruising  EYES - no eye pain, no blurred vision  ENT - no change in hearing, no pain  RESPIRATORY - no shortness of breath, no cough  CARDIAC - no chest pain, no palpitations  GI - no abd pain, no nausea, no vomiting, no diarrhea, no constipation, no bleeding  GENITO-URINARY - no discharge, (+) dysuria; no hematuria,   ENDO - no polydipsia, no polyuria, no heat/no cold intolerance  MUSCULOSKELETAL - no joint pain, no swelling, no redness  NEUROLOGIC - no weakness, no headache, no anesthesia, no paresthesias (+) dizziness (+) confusion   PSYCH - no anxiety, non suicidal, non homicidal, no hallucination, no depression

## 2024-10-23 NOTE — ED PROVIDER NOTE - PHYSICAL EXAMINATION
VITAL SIGNS: I have reviewed nursing notes and confirm.  CONSTITUTIONAL:  in no acute distress.  SKIN: Skin exam is warm and dry, no acute rash.  HEAD: Normocephalic; atraumatic.  EYES: PERRL, EOM intact; conjunctiva and sclera clear.  ENT: No nasal discharge; airway clear. Throat clear.  NECK: Supple; non tender.    CARD: Regular rate and rhythm.  RESP: No wheezes,  no rales or rhonchi.   ABD:  soft; non-distended; (+) mild suprapubic tenderness   EXT: Normal ROM. No clubbing, cyanosis or edema.  NEURO: Alert, oriented. Grossly unremarkable. No focal deficits.  moves all extremities,  normal gait   PSYCH: Cooperative, appropriate.

## 2024-10-23 NOTE — ED ADULT TRIAGE NOTE - CHIEF COMPLAINT QUOTE
pt reports hx recurring UTIs ; worsening urinary symptoms, increasing weakness, fatigue, subjective fever /chills yesterday  ; family states pt was "confused and disoriented yesterday but ok today". last abx dose 2 weeks ago.

## 2024-10-23 NOTE — ED PROVIDER NOTE - PATIENT PORTAL LINK FT
You can access the FollowMyHealth Patient Portal offered by [facility] by registering at the following website: [email or web address]. By joining "[email or web address]"’s FollowMyHealth portal, you will also be able to view your health information using other applications (apps) compatible with our system.

## 2024-10-23 NOTE — ED PROVIDER NOTE - OBJECTIVE STATEMENT
79-year-old female hx of  thyroid disorder, right knee replacement, frequent UTIs presents with altered mental status.  son report that patient was confused yesterday.   Patient report intermittent dizziness feels room spinning but currently improved. Patient recently has seen a urologist scoped and is scheduled for Botox injection for urinary incontinence in December.  Patient was given Bactrim on 9/20 prior to the procedure due to recent knee surgery in April.  Patient has a prescription for Levaquin for any additional procedures that she will need.  Patient report dysuria, mild suprapubic abdominal pain, no chest pain, no nausea or vomiting.  Patient had low-grade fever at home.   Son report that patient is currently back to her baseline mental status.

## 2024-10-24 LAB
CULTURE RESULTS: SIGNIFICANT CHANGE UP
SPECIMEN SOURCE: SIGNIFICANT CHANGE UP

## 2025-03-14 ENCOUNTER — NON-APPOINTMENT (OUTPATIENT)
Age: 80
End: 2025-03-14

## 2025-04-25 NOTE — CARE COORDINATION ASSESSMENT. - DO YOU FEEL SAFE LIVING IN YOUR HOME?
"[facility]  Discharge Summary - Medicine & Pediatrics       Date of Admission:  4/21/2025  Date of Discharge:  4/25/2025  Discharging Provider: Dr. Villarreal  Discharge Service: Medicine Service, FRANCY TEAM 4    Discharge Diagnoses   Sepsis 2/2 pyelonephritis  ELLA superimposed on CKD3  Hyponatremia  Hypomangesemia  Hypophsphatemia   T2DM  Delirium     Clinically Significant Risk Factors     # DMII: A1C = 8.9 % (Ref range: 0.0 - 5.6 %) within past 6 months    # Obesity: Estimated body mass index is 32.23 kg/m  as calculated from the following:    Height as of an earlier encounter on 4/21/25: 1.549 m (5' 1\").    Weight as of this encounter: 77.4 kg (170 lb 9.6 oz).       Follow-ups Needed After Discharge   Follow-up Appointments       Hospital Follow-up with Existing Primary Care Provider (PCP)          Schedule Primary Care visit within: 14 Days             Unresulted Labs Ordered in the Past 30 Days of this Admission       Date and Time Order Name Status Description    4/21/2025 12:58 PM Blood Culture Peripheral Blood Preliminary     4/21/2025 12:58 PM Blood Culture Peripheral Blood Preliminary         These results will be followed up by the hospitalist pool    Discharge Disposition   Discharged to home  Condition at discharge: Stable    Hospital Course   Adam Talamantes was admitted on 4/21/2025.  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       The following problems were addressed during his hospitalization:      # Sepsis 2/2 pyelonephritis  # s/p TURP (5/16/2024)  # Prostatomegaly  # Bilateral moderate hydronephrosis, acute on chronic  # Leukocytosis, resolved   # Lactic acidosis, resolved  Patient presenting with tachycardia, low blood pressure and leukocytosis, meeting " SIRS criteria for sepsis. Procalcitonin elevated. Most likely source of infection is continuous parrish catheter, last exchanged 3/31/25 vs pneumonia. CXR with patchy densities suggestive of edema vs atypical infection. Given SARS/covid/flu negative, legionella and streptococcus negative, respiratory virus panel negative and patient's breathing is much improved, pneumonia is unlikely. Azithromycin discontinued 4/23. UA prior to parrish exchange with elevated WBCs, increased LE. CT CAP with hydronephrosis and inflammatory changes suspicious for bladder obstruction with superimposed infection/inflammation. Urine cultures with E. coli and klebisiella. Initially treated with IV antibiotics with transition to PO cefpodoxime prior to discharge for completion of total 10 day course of antibiotics. Blood cultures reassuringly without growth prior to discharge.  - Cefpodoxime 200 mg BID for 6 days (start 4/24 - 4/29)     # Delirium  Per patient's family, he has slept poorly and is having visual hallucinations. Didn't recognize a family member overnight and thought he was [place] his [place]. Altered mental status was improved in the morning. Likely hospital induced delirium in an elderly patient, exacerbated by lack of sleep. Improving at time of discharge.     # ELLA on CKD III, resolved     Moderate Left and Mild R Hydronephrosis  Baseline of 1.3-1.7 per chart review dating back to 2016, presented with creatinine of 2.31. CT AP shows bilateral hydronephrosis slightly larger compared to prior on 5/29. Unclear etiology of hydronephrosis in setting of continuous parrish catheterization. With elevated urine sodium and urine osm > 350, the etiology of ELLA is a mixed picture. Could be ATR in the setting of sepsis, pre-renal in the setting of dehydration or post-renal in the setting of possible obstruction. Improving with IV fluid resuscitation. Urology consulted with recommendations for renal US on 4/23 demonstrating increased  hydronephrosis compared to 06/2024 and mildly complex left renal cyst. However, clinically improving with further improvements in Cr and urology rec'ed continuation of parrish without need for additional urology follow-up.    # Hyponatremia, improving    # Hypomagnesemia  # Hypophosphatemia   Presenting with hyponatremia to 128. Possible in the setting of poor po intake. Recevied 2.57 L of NS in the ED. Glucose is 333 therefore corrected sodium is 132. On magnesium and phosphate replacement protocols. Urine osm 359, Urine sodium 23, serum osm 294.    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  -  PTA apixaban          Consultations This Hospital Stay   PHARMACY TO DOSE VANCO  NURSING TO CONSULT FOR VASCULAR ACCESS CARE IP CONSULT  UROLOGY IP CONSULT  CARE MANAGEMENT / SOCIAL WORK IP CONSULT  SPEECH LANGUAGE PATH ADULT IP CONSULT  CARE MANAGEMENT / SOCIAL WORK IP CONSULT    Code Status   Prior         Hiral Villarreal MD  [address] 7[address] MED SURG  [address] [place] [facility] [address]  Phone: [phone]        Physician Attestation   I, Hiral Villarreal MD, was present with the medical/JOCELYN student who participated in the service and in the documentation of the note.  I have verified the history and personally performed the physical exam and medical decision making.  I agree with the assessment and plan of care as documented in the jointly edited note.     Please see A&P for additional  details of medical decision making.        Hiral Villarreal MD  Date of Service (when I saw the patient): 04/25/25    ______________________________________________________________________    Physical Exam   Vital Signs: Temp: 99.4  F (37.4  C) Temp src: Oral BP: 131/87 Pulse: 91   Resp: 20 SpO2: 92 % O2 Device: None (Room air)    Weight: 170 lbs 9.61 oz  General Appearance: Awake, alert, lying in bed  Respiratory: clear to auscultation bilaterally  Cardiovascular: regular rate and rhythm, normal S1 and S2  GI: soft, moderately distended, non tender to palpation   Other: Now lower extremity edema  Neuro: Follows commands.      Primary Care Physician   Chucho Espino    Discharge Orders      Primary Care - Care Coordination Referral      Reason for your hospital stay    Mr. Talamantes, you were hospitalized because you had an infection in your kidneys and found to have a kidney injury which has since improved. You were treated with IV antibiotics and IV fluids and the infection improved. You are being discharged with oral antibiotics to complete a total of days of antibiotics (last day will be 4/30). Please complete the full course of antibiotics. No additional medication changes are needed. You were seen by urology for chronic swelling of the kidneys. No follow-up with urology is needed. Please keep your previously planned appointments.    Follow up with your primary care provider in the next 2 weeks for routine post-hospitalization. No labs are needed.     Activity    Your activity upon discharge: activity as tolerated     Tubes and Drains    Current Tubes and Drains:     Drain  Duration           Urinary Drain 04/21/25 Urethral Catheter Non-latex 2 days         To straight gravity drainage. Change catheter every 2 weeks and PRN for leaking or decreased urine output with signs of bladder distention. DO NOT change catheter without a specific Provider order IF diagnosis of benign prostatic hypertrophy (BPH),  neurogenic bladder, or other urological conditions      Diet    Follow this diet upon discharge: Current Diet:Orders Placed This Encounter      Regular Diet Adult     Hospital Follow-up with Existing Primary Care Provider (PCP)            Significant Results and Procedures   Most Recent 3 CBC's:  Recent Labs   Lab Test 04/25/25  0543 04/24/25  0616 04/23/25  0528   WBC 8.5 9.3 13.7*   HGB 10.4* 9.7* 9.5*   MCV 87 87 89    200 175     Most Recent 3 BMP's:  Recent Labs   Lab Test 04/25/25  0748 04/25/25  0543 04/25/25  0213 04/24/25  1144 04/24/25  0616 04/23/25  0836 04/23/25  0528   NA  --  133*  --   --  135  --  134*   POTASSIUM  --  3.8  --   --  3.9  --  3.7   CHLORIDE  --  98  --   --  101  --  103   CO2  --  23  --   --  23  --  21*   BUN  --  14.3  --   --  16.1  --  18.0   CR  --  1.42*  --   --  1.44*  --  1.55*   ANIONGAP  --  12  --   --  11  --  10   GERALDINE  --  9.3  --   --  9.0  --  8.6*   * 240* 281*   < > 199*   < > 209*    < > = values in this interval not displayed.     7-Day Micro Results       Collected Updated Procedure Result Status      04/21/2025 2002 04/22/2025 0835 Legionella Urinary Antigen and Streptococcus pneumoniae antigen [[id number]]    Urine, Diego Catheter    Final result Component Value   Legionella pneumophila serogroup 1 urinary antigen Negative   A negative result does not exclude the possibility of a Legionella infection, as it can be caused by other serogroups and species of Legionella.   Streptococcus pneumoniae antigen Negative   A negative result does not exclude a Streptococcus pneumoniae infection.   Legionella pneumophila Urinary/Strep pneumoniae Antigen Specimen Type Urine            04/21/2025 2002 04/25/2025 0251 Urine Culture [[id number]]    (Abnormal)   Urine, Diego Catheter    Final result Component Value   Culture 50,000-100,000 CFU/mL Klebsiella oxytoca    50,000-100,000 CFU/mL Klebsiella oxytoca        Susceptibility        Klebsiella oxytoca (1)       DAVE      Ampicillin  Resistant  [1]       Ampicillin/ Sulbactam 8 ug/mL Susceptible      Cefazolin >=32 ug/mL Resistant      Cefepime <=0.12 ug/mL Susceptible      Ceftazidime <=0.5 ug/mL Susceptible      Ceftriaxone <=0.25 ug/mL Susceptible      Ciprofloxacin <=0.06 ug/mL Susceptible      Gentamicin <=1 ug/mL Susceptible      Levofloxacin <=0.12 ug/mL Susceptible      Nitrofurantoin 32 ug/mL Susceptible      Piperacillin/Tazobactam <=4 ug/mL Susceptible      Trimethoprim/Sulfamethoxazole <=1/19 ug/mL Susceptible                   [1]  Intrinsically Resistant               Susceptibility        Klebsiella oxytoca (2)      DAVE      Ampicillin  Resistant  [1]       Ampicillin/ Sulbactam 4 ug/mL Susceptible      Cefazolin 16 ug/mL Susceptible      Cefepime <=0.12 ug/mL Susceptible      Ceftazidime <=0.5 ug/mL Susceptible      Ceftriaxone <=0.25 ug/mL Susceptible      Ciprofloxacin <=0.06 ug/mL Susceptible      Gentamicin <=1 ug/mL Susceptible      Levofloxacin <=0.12 ug/mL Susceptible      Nitrofurantoin <=16 ug/mL Susceptible      Piperacillin/Tazobactam <=4 ug/mL Susceptible      Trimethoprim/Sulfamethoxazole <=1/19 ug/mL Susceptible                   [1]  Intrinsically Resistant                    04/21/2025 1943 04/24/2025 2231 Blood Culture Peripheral Blood [[id number]]   Peripheral Blood    Preliminary result Component Value   Culture No growth after 3 days  [P]                04/21/2025 1943 04/21/2025 2221 Respiratory Panel PCR [[id number]]    Swab from Nasopharyngeal    Final result Component Value   Adenovirus Not Detected   Coronavirus Not Detected   This test detects Coronavirus 229E, HKU1, NL63 and OC43 but does not distinguish between them. It does not detect MERS ( Respiratory Syndrome), SARS (Severe Acute Respiratory Syndrome) or 2019-nCoV (Novel 2019) Coronavirus.   Human Metapneumovirus Not Detected   Human Rhin/Enterovirus Not Detected   Influenza A Not Detected   Influenza A,  H1 Not Detected   Influenza A 2009 H1N1 Not Detected   Influenza A, H3 Not Detected   Influenza B Not Detected   Parainfluenza Virus 1 Not Detected   Parainfluenza Virus 2 Not Detected   Parainfluenza Virus 3 Not Detected   Parainfluenza Virus 4 Not Detected   Respiratory Syncytial Virus A Not Detected   Respiratory Syncytial Virus B Not Detected   Chlamydia Pneumoniae Not Detected   Mycoplasma Pneumoniae Not Detected            04/21/2025 1943 04/21/2025 2200 MRSA MSSA PCR, Nasal Swab [[id number]]    Swab from Nares, Bilateral    Final result Component Value   MRSA Target DNA Negative   SA Target DNA Positive            04/21/2025 1642 04/24/2025 1816 Blood Culture Peripheral Blood [[id number]]   Peripheral Blood    Preliminary result Component Value   Culture No growth after 3 days  [P]                04/21/2025 1344 04/23/2025 2133 Urine Culture [[id number]]     (Abnormal)   Urine, Diego Catheter    Final result Component Value   Culture >100,000 CFU/mL Escherichia coli    >100,000 CFU/mL Klebsiella oxytoca        Susceptibility        Escherichia coli      DAVE      Ampicillin >=32 ug/mL Resistant      Ampicillin/ Sulbactam 8 ug/mL Susceptible      Cefazolin 16 ug/mL Susceptible      Cefepime <=0.12 ug/mL Susceptible      Ceftazidime <=0.5 ug/mL Susceptible      Ceftriaxone <=0.25 ug/mL Susceptible      Ciprofloxacin <=0.06 ug/mL Susceptible      Gentamicin <=1 ug/mL Susceptible      Levofloxacin <=0.12 ug/mL Susceptible      Nitrofurantoin 32 ug/mL Susceptible      Piperacillin/Tazobactam <=4 ug/mL Susceptible      Trimethoprim/Sulfamethoxazole <=1/19 ug/mL Susceptible                      Susceptibility        Klebsiella oxytoca      DAVE      Ampicillin  Resistant  [1]       Ampicillin/ Sulbactam 16 ug/mL Intermediate      Cefazolin >=32 ug/mL Resistant      Cefepime <=0.12 ug/mL Susceptible      Ceftazidime <=0.5 ug/mL Susceptible      Ceftriaxone <=0.25 ug/mL Susceptible      Ciprofloxacin <=0.06 ug/mL  Susceptible      Gentamicin <=1 ug/mL Susceptible      Levofloxacin <=0.12 ug/mL Susceptible      Nitrofurantoin 32 ug/mL Susceptible      Piperacillin/Tazobactam <=4 ug/mL Susceptible      Trimethoprim/Sulfamethoxazole <=1/19 ug/mL Susceptible                   [1]  Intrinsically Resistant                    04/21/2025 1322 04/21/2025 1438 Influenza A/B, RSV and SARS-CoV2 PCR (COVID-19) Nose [[id number]]    Swab from Nose    Final result Component Value   Influenza A PCR Negative   Influenza B PCR Negative   RSV PCR Negative   SARS CoV2 PCR Negative   NEGATIVE: SARS-CoV-2 (COVID-19) RNA not detected, presumed negative.                  Most Recent 6 glucoses:  Recent Labs   Lab Test 04/25/25  0748 04/25/25  0543 04/25/25  0213 04/24/25  2214 04/24/25  1709 04/24/25  1144   * 240* 281* 349* 365* 264*     Most Recent Urinalysis:  Recent Labs   Lab Test 04/21/25 2002 04/21/25  1344 09/18/24  0957   COLOR Yellow   < > Yellow   APPEARANCE Slightly Cloudy*   < > Clear   URINEGLC 100*   < > 100*   URINEBILI Negative   < > Negative   URINEKETONE Negative   < > Negative   SG 1.016   < > 1.025   UBLD Moderate*   < > Large*   URINEPH 6.0   < > 6.5   PROTEIN 30*   < > >=300*   UROBILINOGEN  --   --  0.2   NITRITE Positive*   < > Negative   LEUKEST Large*   < > Small*   RBCU 17*   < >  --    WBCU >182*   < >  --     < > = values in this interval not displayed.   ,   Results for orders placed or performed during the hospital encounter of 04/21/25   XR Chest Port 1 View    Narrative    Portable chest    INDICATION: Sepsis    COMPARISON: 6/1/2024    Subtle patchy densities on sedation indicate mild edema or less likely  infection. Heart size normal. Atherosclerotic calcification of the  aortic knob.      Impression    IMPRESSION: Patchy densities in the lungs may indicate edema.  Recommend follow-up to clearing to exclude atypical infection.    COMPA CHASE MD         SYSTEM ID:  [id number]   CT Chest Abdomen Pelvis  w/o Contrast    Narrative    EXAM: CT CHEST ABDOMEN PELVIS W/O CONTRAST 4/21/2025 4:37 PM    HISTORY: 83 years Male Sepsis, ? PNA, ? UTI/Pyelo    COMPARISON: CT 5/29/2024, 5/15/2024    TECHNIQUE: Helical CT images from the thoracic inlet through the  symphysis pubis were obtained without IV contrast.     FINDINGS:   image(s) are noncontributory.    LINES/TUBES: Diego catheter.    CHEST:  LOWER NECK: Unremarkable thyroid.     PULMONARY: Evaluation field somewhat limited by respiratory motion.  Central airways are clear. Few patchy groundglass and airspace  opacities within the lung bases. No pneumothorax or pleural effusion.    CARDIAC: Heart size is normal. Small pericardial effusion. Trace  coronary artery calcifications.    MEDIASTINUM: Thoracic aorta and main pulmonary artery are  unremarkable. Normal appearance and configuration of the great vessels  off of the aortic arch. No suspicious mediastinal, hilar, or axillary  lymph nodes.     ESOPHAGUS: Unremarkable.    ABDOMEN/PELVIS:  HEPATIC: Previously identified hypoattenuating foci within the hepatic  parenchyma are less conspicuous on this noncontrast examination. No  new suspicious hepatic lesion. Hepatic steatosis.    BILIARY: Gallbladder sludge. No intrahepatic or extrahepatic biliary  ductal dilation.    PANCREAS: Atrophic changes. No focal pancreatic lesion. The main  pancreatic duct is not dilated.    SPLEEN: No splenomegaly. No suspicious lesions or masses.    ADRENALS: Unremarkable.    RENAL: Increased bilateral moderate hydroureteronephrosis, right  greater than left, with surrounding periureteral inflammatory changes.  No evidence of obstructing stone or mass. Partially calcified renal  cysts, for example lower pole right kidney (series 3 image 352).    URINARY BLADDER: Diego catheter in the bladder. Perivesicular  inflammatory changes along the bladder dome    REPRODUCTIVE: Prostate is enlarged.    GASTROINTESTINAL: Appendectomy: Normal caliber  large and small bowel.  No abnormal bowel wall thickening or enhancement. No pneumatosis or  portal venous gas.     MESENTERY/PERITONEUM: No ascites or pneumoperitoneum.    VASCULAR: Nonaneurysmal abdominal aorta. Vascular patency cannot be  assessed as noncontrast examination. Aortoiliac vascular  calcifications.    LYMPH NODES: No lymphadenopathy.    MUSCULOSKELETAL: Multilevel degenerative changes of the spine with  similar grade 2 anterolisthesis of L5 on S1. Bilateral L5 pars defect.  Chronic anterior wedging of L2. No acute or suspicious osseous  abnormality.       Impression    IMPRESSION:   1.  Prostatomegaly with increased bilateral moderate  hydroureteronephrosis, right greater than left, and  periureteral/perivesicular inflammatory changes , suspicious for  bladder obstruction with superimposed infection/inflammation.  Recommend correlation with urinalysis.  2.  Small pericardial effusion.   3.  Limited assessment of the lung fields secondary to respiratory  motion. Few scattered groundglass and patchy airspace opacities in the  bases, nonspecific, can be seen with edema, atelectasis, and/or  infection.  4.  Gallbladder sludge without evidence of acute cholecystitis.    I have personally reviewed the examination and initial interpretation  and I agree with the findings.    DARLENE DELANEY DO         SYSTEM ID:  [id number]   NM Lung Scan Ventilation and Perfusion    Narrative    Examination:NM LUNG SCAN VENTILATION AND PERFUSION, 4/21/2025 6:38 PM     Indication:  Dyspnea, + dimer     Additional Information: none    D-Dimer: 1.09    TECHNIQUE:    The patient received 2 mCi of Tc-99m DTPA aerosol for ventilation and  7 mCi of Tc-99m MAA for perfusion. Multiple images were obtained of  the lungs in Anterior, posterior, SMITH, RPO, LPO, and  Sami projections.    COMPARISON: Same day chest radiograph and CT    FINDINGS:    The perfusion study demonstrates: No moderate or large segmental or  subsegmental filling  defects.     The ventilation study demonstrates: Suboptimal ventilation of the  lungs, with aerosol deposition in central airways..      Impression    IMPRESSION:    1. Pulmonary emboli is not present (low probability scan).  2. Ventilation demonstrates central clumping of the radiotracer,  indicating turbulent airflow a nonspecific finding.     I have personally reviewed the examination and initial interpretation  and I agree with the findings.    DARLENE DELANEY DO         SYSTEM ID:  [id number]   US Renal Complete Non-Vascular    Narrative    EXAMINATION: US RENAL COMPLETE NON-VASCULAR, 4/23/2025 10:53 AM     COMPARISON: None.    HISTORY: Hydronephrosis    TECHNIQUE: The kidneys and bladder were scanned in the standard  fashion with specialized ultrasound transducer(s) using both gray  scale and limited color/spectral Doppler techniques.    FINDINGS:    Right kidney: Measures 12.7 cm in length. . No focal mass. Moderate  hydronephrosis, slightly increased compared to prior.    Left kidney: Measures 12.6  cm in length. Left renal lower pole cyst  measuring with thin septations, 1.6 x 1.5 x 1.2 cm (image 45). Mild to  moderate hydronephrosis.     Bladder: not visualized, likely decompressed.      Impression    IMPRESSION:    1.  Bilateral mild-to-moderate hydronephrosis, similar to slightly  increased compared to prior ultrasound from 6/1/2024.  2.  Mildly complex left renal cyst measuring up to 1.6 cm. Consider  attention on follow-up.    PABLITO ACEVEDO MD         SYSTEM ID:  [id number]       Discharge Medications   Discharge Medication List as of 4/25/2025  9:17 AM        START taking these medications    Details   cefpodoxime (VANTIN) 200 MG tablet Take 1 tablet (200 mg) by mouth 2 times daily for 6 days., Disp-11 tablet, R-0, E-Prescribe           CONTINUE these medications which have NOT CHANGED    Details   acetaminophen (TYLENOL) 500 MG tablet Take 1 tablet (500 mg) by mouth every 4 hours as needed for mild pain,  Disp-100 tablet, R-3, E-Prescribe      allopurinol (ZYLOPRIM) 100 MG tablet TAKE 2 TABLETS(200 MG) BY MOUTH DAILY, Disp-180 tablet, R-3, E-PrescribeZERO refills remain on this prescription. Your patient is requesting advance approval of refills for this medication to PREVENT ANY MISSED DOSES      apixaban ANTICOAGULANT (ELIQUIS ANTICOAGULANT) 5 MG tablet Take 1 tablet (5 mg) by mouth 2 times daily, Disp-180 tablet, R-3, E-Prescribe      Continuous Glucose  (FREESTYLE SUSANNAH 2 READER) TONIE USE TO READ BLOOD SUGARS PER MANUFACTRUERS INSTRUCTIONS, Disp-1 each, R-0, E-Prescribe      Continuous Glucose Sensor (FREESTYLE SUSANNAH 2 SENSOR) MISC CHANGE EVERY 14 DAYS, Disp-2 each, R-11, E-Prescribe      dextromethorphan-quiNIDine (NUEDEXTA) 20-10 MG capsule Take 1 capsule by mouth every 12 hours., Disp-60 capsule, R-11, E-Prescribe      DULoxetine (CYMBALTA) 30 MG capsule Take 1 capsule by mouth every morning and 2 capsules every evening, Disp-135 capsule, R-6, E-Prescribe      hydrOXYzine HCl (ATARAX) 25 MG tablet Take 1 tablet (25 mg) by mouth every 8 hours as needed for itching., Disp-60 tablet, R-4, E-Prescribe      insulin aspart (NOVOLOG FLEXPEN) 100 UNIT/ML pen Inject 16 units before breakfast, and 6 units under the skin before lunch and dinner, Disp-30 mL, R-3, E-Prescribe      insulin glargine (LANTUS PEN) 100 UNIT/ML pen Inject 30 Units subcutaneously every 24 hours, Disp-30 mL, R-3, E-PrescribeIf Lantus is not covered by insurance, may substitute Basaglar or Semglee or other insulin glargine product per insurance preference at same dose and frequency.        insulin pen needle (BD PEN NEEDLE SALVATORE 2ND GEN) 32G X 4 MM miscellaneous USE 1 PEN NEEDLE FOUR TIMES DAILY OR AS DIRECTED, Disp-400 each, R-2, E-Prescribe      metoprolol succinate ER (TOPROL XL) 50 MG 24 hr tablet Take 1 tablet (50 mg) by mouth daily., Disp-90 tablet, R-3, E-Prescribe      pantoprazole (PROTONIX) 40 MG EC tablet Take 1 tablet (40 mg) by  mouth every morning (before breakfast)., Disp-90 tablet, R-3, E-Prescribe      rosuvastatin (CRESTOR) 20 MG tablet TAKE 1 TABLET(20 MG) BY MOUTH AT BEDTIME, Disp-90 tablet, R-2, E-Prescribe      senna-docusate (STIMULANT LAXATIVE) 8.6-50 MG tablet Take 2 tablets by mouth 2 times daily as needed for constipation., Disp-120 tablet, R-9, E-Prescribe      sitagliptin (JANUVIA) 50 MG tablet TAKE 1 TABLET(50 MG) BY MOUTH DAILY, Disp-90 tablet, R-0, E-Prescribe      tamsulosin (FLOMAX) 0.4 MG capsule TAKE 1 CAPSULE BY MOUTH EVERY DAY, Disp-90 capsule, R-1, E-Prescribe      traZODone (DESYREL) 50 MG tablet Take 1-2 tablets ( mg) by mouth nightly as needed for sleep., Disp-180 tablet, R-3, E-Prescribe      calcium carbonate (TUMS) 500 MG chewable tablet Take 1 chew tab by mouth daily as needed for heartburn, Historical      cilostazol (PLETAL) 50 MG tablet Take 2 tablets (100 mg) by mouth 2 times daily., Disp-60 tablet, R-2, E-Prescribe      clobetasol propionate (TEMOVATE) 0.05 % external cream Apply topically 2 times daily as needed.Disp-60 g, [id number]      meclizine (ANTIVERT) 12.5 MG tablet Take 1 tablet (12.5 mg) by mouth 3 times daily as needed for dizziness, Disp-60 tablet, R-6, E-Prescribe      polyethylene glycol (MIRALAX) 17 GM/Dose powder MIX 17 GRAMS IN LIQUID AND TAKE BY MOUTH ONCE DAILY AS NEEDED FOR CONSTIPATION, Disp-510 g, R-6, E-Prescribe           Allergies   Allergies   Allergen Reactions    Jardiance [Empagliflozin]      Had complicated uti on jardiance    Lisinopril Cough      Yes

## 2025-07-05 ENCOUNTER — NON-APPOINTMENT (OUTPATIENT)
Age: 80
End: 2025-07-05

## (undated) DEVICE — DRSG STOCKINETTE TUBULAR COTTON 1PLY 6X72"

## (undated) DEVICE — MAKO DRAPE KIT

## (undated) DEVICE — CRYO/CUFF GRAVITY COOLER KNEE LARGE

## (undated) DEVICE — HOOD FLYTE STRYKER HELMET SHIELD

## (undated) DEVICE — MAKO BLADE NARROW

## (undated) DEVICE — PREP CHLORAPREP HI-LITE ORANGE 26ML

## (undated) DEVICE — SUT VICRYL 2-0 36" CT-1 UNDYED

## (undated) DEVICE — WARMING BLANKET UPPER ADULT

## (undated) DEVICE — HOOD T5 PEELAWAY

## (undated) DEVICE — SUT VLOC 90 4-0 18" P-12 UNDYED

## (undated) DEVICE — NDL HYPO SAFE 20G X 1.5" (YELLOW)

## (undated) DEVICE — ELCTR STRYKER NEPTUNE SMOKE EVACUATION PENCIL (GREEN)

## (undated) DEVICE — DRSG STOCKINETTE TUBULAR COTTON 2PLY 6X72"

## (undated) DEVICE — ZIMMER PULSAVAC PLUS FAN KIT

## (undated) DEVICE — SOL IRR BAG NS 0.9% 3000ML

## (undated) DEVICE — PACK TOTAL KNEE

## (undated) DEVICE — DRAPE 1/2 SHEET 40X57"

## (undated) DEVICE — SYR LUER LOK 20CC

## (undated) DEVICE — SUT VICRYL 1 36" CTX UNDYED

## (undated) DEVICE — MAKO VIZADISC KNEE TRACKING KIT

## (undated) DEVICE — DRAPE IOBAN 33" X 23"

## (undated) DEVICE — ONETRAC LIGHTED RETRACTOR LXS CROWN TIP DISP

## (undated) DEVICE — GLV 8 PROTEXIS (BLUE)

## (undated) DEVICE — MAKO BLADE STANDARD

## (undated) DEVICE — GLV 8 PROTEXIS (WHITE)

## (undated) DEVICE — DRAPE STERI-DRAPE INCISE 32X33"

## (undated) DEVICE — DRSG PICO NPWT 4X12"

## (undated) DEVICE — DRAPE 3/4 SHEET 52X76"